# Patient Record
Sex: FEMALE | Race: BLACK OR AFRICAN AMERICAN | NOT HISPANIC OR LATINO | URBAN - METROPOLITAN AREA
[De-identification: names, ages, dates, MRNs, and addresses within clinical notes are randomized per-mention and may not be internally consistent; named-entity substitution may affect disease eponyms.]

---

## 2018-09-11 ENCOUNTER — INPATIENT (INPATIENT)
Facility: HOSPITAL | Age: 48
LOS: 0 days | Discharge: HOME | DRG: 282 | End: 2018-09-12
Attending: INTERNAL MEDICINE | Admitting: INTERNAL MEDICINE
Payer: COMMERCIAL

## 2018-09-11 VITALS
HEART RATE: 90 BPM | SYSTOLIC BLOOD PRESSURE: 132 MMHG | DIASTOLIC BLOOD PRESSURE: 86 MMHG | RESPIRATION RATE: 18 BRPM | TEMPERATURE: 98 F | OXYGEN SATURATION: 96 %

## 2018-09-11 DIAGNOSIS — I10 ESSENTIAL (PRIMARY) HYPERTENSION: ICD-10-CM

## 2018-09-11 DIAGNOSIS — E87.6 HYPOKALEMIA: ICD-10-CM

## 2018-09-11 DIAGNOSIS — Q04.8 OTHER SPECIFIED CONGENITAL MALFORMATIONS OF BRAIN: Chronic | ICD-10-CM

## 2018-09-11 DIAGNOSIS — I25.10 ATHEROSCLEROTIC HEART DISEASE OF NATIVE CORONARY ARTERY WITHOUT ANGINA PECTORIS: ICD-10-CM

## 2018-09-11 DIAGNOSIS — I21.4 NON-ST ELEVATION (NSTEMI) MYOCARDIAL INFARCTION: ICD-10-CM

## 2018-09-11 DIAGNOSIS — D57.3 SICKLE-CELL TRAIT: ICD-10-CM

## 2018-09-11 DIAGNOSIS — R07.9 CHEST PAIN, UNSPECIFIED: ICD-10-CM

## 2018-09-11 LAB
ALBUMIN SERPL ELPH-MCNC: 3.9 G/DL — SIGNIFICANT CHANGE UP (ref 3.4–5)
ALP SERPL-CCNC: 63 U/L — SIGNIFICANT CHANGE UP (ref 40–120)
ALT FLD-CCNC: 27 U/L — SIGNIFICANT CHANGE UP (ref 12–42)
ANION GAP SERPL CALC-SCNC: 9 MMOL/L — SIGNIFICANT CHANGE UP (ref 9–16)
APPEARANCE UR: CLEAR — SIGNIFICANT CHANGE UP
APTT BLD: 32.3 SEC — SIGNIFICANT CHANGE UP (ref 27.5–36.5)
APTT BLD: 47.7 SEC — HIGH (ref 27.5–37.4)
AST SERPL-CCNC: 22 U/L — SIGNIFICANT CHANGE UP (ref 15–37)
BILIRUB SERPL-MCNC: 0.5 MG/DL — SIGNIFICANT CHANGE UP (ref 0.2–1.2)
BILIRUB UR-MCNC: NEGATIVE — SIGNIFICANT CHANGE UP
BUN SERPL-MCNC: 10 MG/DL — SIGNIFICANT CHANGE UP (ref 7–23)
CALCIUM SERPL-MCNC: 9.3 MG/DL — SIGNIFICANT CHANGE UP (ref 8.5–10.5)
CHLORIDE SERPL-SCNC: 103 MMOL/L — SIGNIFICANT CHANGE UP (ref 96–108)
CK MB CFR SERPL CALC: 15 NG/ML — HIGH (ref 0–6.7)
CK SERPL-CCNC: 248 U/L — HIGH (ref 25–170)
CO2 SERPL-SCNC: 26 MMOL/L — SIGNIFICANT CHANGE UP (ref 22–31)
COLOR SPEC: YELLOW — SIGNIFICANT CHANGE UP
CREAT SERPL-MCNC: 1.09 MG/DL — SIGNIFICANT CHANGE UP (ref 0.5–1.3)
D DIMER BLD IA.RAPID-MCNC: 211 NG/ML DDU — SIGNIFICANT CHANGE UP
DIFF PNL FLD: NEGATIVE — SIGNIFICANT CHANGE UP
GLUCOSE SERPL-MCNC: 108 MG/DL — HIGH (ref 70–99)
GLUCOSE UR QL: NEGATIVE — SIGNIFICANT CHANGE UP
HCG UR QL: NEGATIVE — SIGNIFICANT CHANGE UP
HCT VFR BLD CALC: 41.8 % — SIGNIFICANT CHANGE UP (ref 34.5–45)
HCT VFR BLD CALC: 45.6 % — HIGH (ref 34.5–45)
HGB BLD-MCNC: 15.2 G/DL — SIGNIFICANT CHANGE UP (ref 11.5–15.5)
HGB BLD-MCNC: 16.1 G/DL — HIGH (ref 11.5–15.5)
INR BLD: 1.07 — SIGNIFICANT CHANGE UP (ref 0.88–1.16)
KETONES UR-MCNC: NEGATIVE — SIGNIFICANT CHANGE UP
LEUKOCYTE ESTERASE UR-ACNC: NEGATIVE — SIGNIFICANT CHANGE UP
LIDOCAIN IGE QN: 115 U/L — SIGNIFICANT CHANGE UP (ref 73–393)
MAGNESIUM SERPL-MCNC: 2 MG/DL — SIGNIFICANT CHANGE UP (ref 1.6–2.6)
MCHC RBC-ENTMCNC: 27.5 PG — SIGNIFICANT CHANGE UP (ref 27–34)
MCHC RBC-ENTMCNC: 27.8 PG — SIGNIFICANT CHANGE UP (ref 27–34)
MCHC RBC-ENTMCNC: 35.3 G/DL — SIGNIFICANT CHANGE UP (ref 32–36)
MCHC RBC-ENTMCNC: 36.4 G/DL — HIGH (ref 32–36)
MCV RBC AUTO: 75.7 FL — LOW (ref 80–100)
MCV RBC AUTO: 78.6 FL — LOW (ref 80–100)
NITRITE UR-MCNC: NEGATIVE — SIGNIFICANT CHANGE UP
NT-PROBNP SERPL-SCNC: 1233 PG/ML — HIGH
PH UR: 7.5 — SIGNIFICANT CHANGE UP (ref 5–8)
PLATELET # BLD AUTO: 297 K/UL — SIGNIFICANT CHANGE UP (ref 150–400)
PLATELET # BLD AUTO: 348 K/UL — SIGNIFICANT CHANGE UP (ref 150–400)
POTASSIUM SERPL-MCNC: 3.1 MMOL/L — LOW (ref 3.5–5.3)
POTASSIUM SERPL-SCNC: 3.1 MMOL/L — LOW (ref 3.5–5.3)
PROT SERPL-MCNC: 7.8 G/DL — SIGNIFICANT CHANGE UP (ref 6.4–8.2)
PROT UR-MCNC: NEGATIVE MG/DL — SIGNIFICANT CHANGE UP
PROTHROM AB SERPL-ACNC: 11.8 SEC — SIGNIFICANT CHANGE UP (ref 9.8–12.7)
RBC # BLD: 5.52 M/UL — HIGH (ref 3.8–5.2)
RBC # BLD: 5.8 M/UL — HIGH (ref 3.8–5.2)
RBC # FLD: 13.1 % — SIGNIFICANT CHANGE UP (ref 10.3–16.9)
RBC # FLD: 13.5 % — SIGNIFICANT CHANGE UP (ref 10.3–16.9)
SODIUM SERPL-SCNC: 138 MMOL/L — SIGNIFICANT CHANGE UP (ref 132–145)
SP GR SPEC: 1.01 — SIGNIFICANT CHANGE UP (ref 1–1.03)
TROPONIN I SERPL-MCNC: 0.82 NG/ML — CRITICAL HIGH (ref 0.02–0.06)
TROPONIN T SERPL-MCNC: 0.15 NG/ML — CRITICAL HIGH (ref 0–0.01)
TSH SERPL-MCNC: 1.82 UIU/ML — SIGNIFICANT CHANGE UP (ref 0.36–3.74)
UROBILINOGEN FLD QL: 0.2 E.U./DL — SIGNIFICANT CHANGE UP
WBC # BLD: 10.4 K/UL — SIGNIFICANT CHANGE UP (ref 3.8–10.5)
WBC # BLD: 12.3 K/UL — HIGH (ref 3.8–10.5)
WBC # FLD AUTO: 10.4 K/UL — SIGNIFICANT CHANGE UP (ref 3.8–10.5)
WBC # FLD AUTO: 12.3 K/UL — HIGH (ref 3.8–10.5)

## 2018-09-11 PROCEDURE — 71046 X-RAY EXAM CHEST 2 VIEWS: CPT | Mod: 26

## 2018-09-11 PROCEDURE — 99223 1ST HOSP IP/OBS HIGH 75: CPT

## 2018-09-11 PROCEDURE — 99285 EMERGENCY DEPT VISIT HI MDM: CPT | Mod: 25

## 2018-09-11 PROCEDURE — 93010 ELECTROCARDIOGRAM REPORT: CPT

## 2018-09-11 RX ORDER — POTASSIUM CHLORIDE 20 MEQ
40 PACKET (EA) ORAL ONCE
Qty: 0 | Refills: 0 | Status: COMPLETED | OUTPATIENT
Start: 2018-09-11 | End: 2018-09-11

## 2018-09-11 RX ORDER — ATORVASTATIN CALCIUM 80 MG/1
80 TABLET, FILM COATED ORAL DAILY
Qty: 0 | Refills: 0 | Status: DISCONTINUED | OUTPATIENT
Start: 2018-09-11 | End: 2018-09-12

## 2018-09-11 RX ORDER — TRIAMTERENE/HYDROCHLOROTHIAZID 75 MG-50MG
1 TABLET ORAL DAILY
Qty: 0 | Refills: 0 | Status: DISCONTINUED | OUTPATIENT
Start: 2018-09-12 | End: 2018-09-12

## 2018-09-11 RX ORDER — ASPIRIN/CALCIUM CARB/MAGNESIUM 324 MG
325 TABLET ORAL DAILY
Qty: 0 | Refills: 0 | Status: DISCONTINUED | OUTPATIENT
Start: 2018-09-12 | End: 2018-09-12

## 2018-09-11 RX ORDER — HEPARIN SODIUM 5000 [USP'U]/ML
INJECTION INTRAVENOUS; SUBCUTANEOUS
Qty: 25000 | Refills: 0 | Status: DISCONTINUED | OUTPATIENT
Start: 2018-09-11 | End: 2018-09-12

## 2018-09-11 RX ORDER — CLOPIDOGREL BISULFATE 75 MG/1
75 TABLET, FILM COATED ORAL DAILY
Qty: 0 | Refills: 0 | Status: DISCONTINUED | OUTPATIENT
Start: 2018-09-12 | End: 2018-09-12

## 2018-09-11 RX ORDER — METOPROLOL TARTRATE 50 MG
25 TABLET ORAL ONCE
Qty: 0 | Refills: 0 | Status: COMPLETED | OUTPATIENT
Start: 2018-09-11 | End: 2018-09-11

## 2018-09-11 RX ORDER — ASPIRIN/CALCIUM CARB/MAGNESIUM 324 MG
325 TABLET ORAL ONCE
Qty: 0 | Refills: 0 | Status: COMPLETED | OUTPATIENT
Start: 2018-09-11 | End: 2018-09-11

## 2018-09-11 RX ORDER — PANTOPRAZOLE SODIUM 20 MG/1
40 TABLET, DELAYED RELEASE ORAL ONCE
Qty: 0 | Refills: 0 | Status: COMPLETED | OUTPATIENT
Start: 2018-09-11 | End: 2018-09-11

## 2018-09-11 RX ORDER — METOPROLOL TARTRATE 50 MG
12.5 TABLET ORAL
Qty: 0 | Refills: 0 | Status: DISCONTINUED | OUTPATIENT
Start: 2018-09-11 | End: 2018-09-12

## 2018-09-11 RX ORDER — AMLODIPINE BESYLATE 2.5 MG/1
5 TABLET ORAL DAILY
Qty: 0 | Refills: 0 | Status: DISCONTINUED | OUTPATIENT
Start: 2018-09-11 | End: 2018-09-12

## 2018-09-11 RX ORDER — HEPARIN SODIUM 5000 [USP'U]/ML
4700 INJECTION INTRAVENOUS; SUBCUTANEOUS EVERY 6 HOURS
Qty: 0 | Refills: 0 | Status: DISCONTINUED | OUTPATIENT
Start: 2018-09-11 | End: 2018-09-11

## 2018-09-11 RX ORDER — SODIUM CHLORIDE 9 MG/ML
500 INJECTION INTRAMUSCULAR; INTRAVENOUS; SUBCUTANEOUS
Qty: 0 | Refills: 0 | Status: DISCONTINUED | OUTPATIENT
Start: 2018-09-11 | End: 2018-09-12

## 2018-09-11 RX ORDER — HEPARIN SODIUM 5000 [USP'U]/ML
4700 INJECTION INTRAVENOUS; SUBCUTANEOUS ONCE
Qty: 0 | Refills: 0 | Status: COMPLETED | OUTPATIENT
Start: 2018-09-11 | End: 2018-09-11

## 2018-09-11 RX ORDER — CLOPIDOGREL BISULFATE 75 MG/1
600 TABLET, FILM COATED ORAL ONCE
Qty: 0 | Refills: 0 | Status: COMPLETED | OUTPATIENT
Start: 2018-09-11 | End: 2018-09-11

## 2018-09-11 RX ORDER — ATORVASTATIN CALCIUM 80 MG/1
80 TABLET, FILM COATED ORAL ONCE
Qty: 0 | Refills: 0 | Status: COMPLETED | OUTPATIENT
Start: 2018-09-11 | End: 2018-09-11

## 2018-09-11 RX ORDER — ALPRAZOLAM 0.25 MG
0.25 TABLET ORAL ONCE
Qty: 0 | Refills: 0 | Status: DISCONTINUED | OUTPATIENT
Start: 2018-09-11 | End: 2018-09-12

## 2018-09-11 RX ADMIN — HEPARIN SODIUM 4700 UNIT(S): 5000 INJECTION INTRAVENOUS; SUBCUTANEOUS at 17:29

## 2018-09-11 RX ADMIN — Medication 25 MILLIGRAM(S): at 17:29

## 2018-09-11 RX ADMIN — Medication 40 MILLIEQUIVALENT(S): at 17:29

## 2018-09-11 RX ADMIN — ATORVASTATIN CALCIUM 80 MILLIGRAM(S): 80 TABLET, FILM COATED ORAL at 22:33

## 2018-09-11 RX ADMIN — HEPARIN SODIUM 950 UNIT(S)/HR: 5000 INJECTION INTRAVENOUS; SUBCUTANEOUS at 17:29

## 2018-09-11 RX ADMIN — PANTOPRAZOLE SODIUM 40 MILLIGRAM(S): 20 TABLET, DELAYED RELEASE ORAL at 16:39

## 2018-09-11 RX ADMIN — ATORVASTATIN CALCIUM 80 MILLIGRAM(S): 80 TABLET, FILM COATED ORAL at 17:29

## 2018-09-11 RX ADMIN — CLOPIDOGREL BISULFATE 600 MILLIGRAM(S): 75 TABLET, FILM COATED ORAL at 17:29

## 2018-09-11 RX ADMIN — Medication 325 MILLIGRAM(S): at 17:17

## 2018-09-11 NOTE — ED ADULT NURSE NOTE - OBJECTIVE STATEMENT
here for an episode of chest pain while she was working out. pt denies any chest pain on arrival states pain was relieved when she passed gas. Pt denies cp, sob , dizziness, headache and back pain

## 2018-09-11 NOTE — H&P ADULT - NSHPPHYSICALEXAM_GEN_ALL_CORE
Temp 98.6F, HR 70bpm, /80, RR 16, O2 sat 98% RA, Weight 200lbs, Height 5' 6"    T(C): 36.9 (09-11-18 @ 17:32), Max: 36.9 (09-11-18 @ 17:32)  HR: 82 (09-11-18 @ 20:51) (82 - 104)  BP: 129/88 (09-11-18 @ 20:51) (129/88 - 156/108)  RR: 16 (09-11-18 @ 20:51) (16 - 18)  SpO2: 96% (09-11-18 @ 20:51) (96% - 98%)  Wt(kg): --    Appearance: Normal	  HEENT:   Normal oral mucosa, PERRL, EOMI	  Neck: Supple,- JVD; No Carotid Bruit and 2+ pulses B/L  Cardiovascular: Normal S1 S2, No JVD, No murmurs  Respiratory: Lungs clear to auscultation/No Rales, Rhonchi, Wheezing	  Gastrointestinal:  Soft, Non-tender, + BS	  Skin: No rashes, No ecchymoses, No cyanosis  Extremities: Normal range of motion, No clubbing, cyanosis or edema  Vascular: Femoral pulses 2+ b/l without bruit, DP 2+ b/l, PT 1+ b/l  Neurologic: Non-focal  Psychiatry: A & O x 3, Mood & affect appropriate

## 2018-09-11 NOTE — H&P ADULT - ATTENDING COMMENTS
Assessment: Patient personally seen and examined myself during rounds with the Physician Assistant/House Staff/Nurse Practitioner   ON DATE 9/11/18  Physician Assistant/House Staff/Nurse Practitioner note read, including vitals, physical findings, laboratory data, and radiological reports.   Revisions included below.   Direct personal management at bed side and extensive interpretation of the data.    Plan was outlined and discussed in details with the Physician Assistant/House Staff/Nurse Practitioner.    Decision making of high complexity   Risk high of complications, morbidity, and/or mortality  Assessment and Action taken for acute disease activity to reflect the level of care provided:  -Hemodynamic evaluation and support  -ACS assessment and treatment as applicable  -Heart failure assessment and treatment as applicable  -Cardiac Telemetry reviewed  -Medication reconciliation  -Review laboratory data  -EKG reviewed - no stemi  -Echo ordered  -Interdisciplinary discussion with IC / EP / HF / CTS teams as needed  TIME SPENT in evaluation and management, reassessments, review and interpretation of labs and x-rays, and hemodynamic management, formulating a plan and coordinating care: ___70____ MIN.  Time does not include procedural time.    Jenny Casey MD  Cardiology    Mobile: [phone]  Office: [phone]  [address] [id number]

## 2018-09-11 NOTE — H&P ADULT - PROBLEM SELECTOR PLAN 1
Telemetry, ECG serial CE, Echocardiogram and NPO for Left Heart Catheterization with possible intervention.  ECG NSR@100bpm with TWI in lateral leads Troponin 0.812.  NPO for Cath  Consent and Precathed

## 2018-09-11 NOTE — H&P ADULT - NSHPREVIEWOFSYSTEMS_GEN_ALL_CORE
GENERAL, CONSTITUTIONAL : denies recent weight loss, fever, chills  EYES, VISION: denies changes in vision   EARS, NOSE, THROAT: denies hearing loss  HEART, CARDIOVASCULAR: admits to  chest pain described as pressure,  palpitations,   RESPIRATORY:Admit SOB 2/2 chest pain,  Denies cough, wheezing, PND, orthopnea  GASTROINTESTINAL: Admits to nausea no emesis Denies abdominal pain, heartburn, bloody stool, dark tarry stool  GENITOURINARY: Denies frequent urination, urgency  MUSCULOSKELETAL denies joint pain or swelling, restricted motion, musculoskeletal pain.   SKIN & INTEGUMENTARY Denies rashes, sores, blisters, blisters, growths.  NEUROLOGICAL: Denies numbness or tingling sensations, sensation loss, burning.   PSYCHIATRIC: Denies nervousness, anxiety, depression  ENDOCRINE Denies heat or cold intolerance, excessive thirst  HEMATOLOGIC/LYMPHATIC: Denies abnormal bleeding, bleeding of any kind

## 2018-09-11 NOTE — ED ADULT TRIAGE NOTE - CHIEF COMPLAINT QUOTE
sent from PMD for chest pain starting an hour ago and abnormal EKG. pt states pain has resolved took 162 of ASA given by EMS

## 2018-09-11 NOTE — ED ADULT NURSE NOTE - CHPI ED NUR SYMPTOMS NEG
no chills/no dizziness/no nausea/no back pain/no congestion/no chest pain/no syncope/no shortness of breath/no fever/no diaphoresis/no vomiting

## 2018-09-11 NOTE — ED PROVIDER NOTE - DIAGNOSTIC INTERPRETATION
Xray (wet reads) interpreted by CAYLA NASH   CXR - Cardiac silhouette, aortic knob, mediastinal and hilar contours appear wnl, no acute consolidation, infiltrate, effusion, or PTX. No bony abnormalities noted

## 2018-09-11 NOTE — H&P ADULT - HISTORY OF PRESENT ILLNESS
INCOMPLETE   49 yo female with a PMhx HTN, ventriculomegaly initially presented to a clinic today (9/11/2018) due to ongoing chest pain. Patient reports she was doing some weight lifting and planks around noon today and felt suddenly flushed and developed sharp substernal chest pain radiating down her left arm associated with palpitations and nausea. Patient notes she was belching/burping and gasping for air when chest pain occured and symptoms self-resolved within several minutes. Patient went to an outpatient clinic,  EMS was called and patient took  mg x one dose in route. On arrival to [facility] ED; Vital signs stable: BP: 132/86, HR: 80s, RR: 19, Afebrile, and O2: 96% RA. 12 Lead EKG revealed Sinus Rhythm with nonspecific TW changes in V6, I, AVL (CONFRIM). Pertinent lab values revealed include WBC: 12.3K, D Dimer negative, K: 3.1, TSH WNL and Troponin 0.819. ACS Protocol was initiated and patient was loaded with  mg, Plavix 600 mg, Heparin gtt with bolus started and patient received Lipitor 80 mg daily, Lopressor 25 mg PO x one dose, Protonix 40 mg IV x one dose and Kdur 40 mEq orally x one dose. Patient is now transferred to [facility] for management of R/I NSTEMI and plan for cardiac catheterization with possible intervention if clinically indicated. INCOMPLETE   47 yo female with a PMhx HTN, ventriculomegaly initially presented to a clinic today (9/11/2018) due to ongoing chest pain. Patient reports she was doing some weight lifting and planks around noon today and felt suddenly flushed and developed sharp substernal chest pain radiating down her left arm associated with palpitations and nausea. Patient notes she was belching/burping and gasping for air when chest pain occured and symptoms self-resolved within several minutes. Patient went to an outpatient clinic,  EMS was called and patient took  mg x one dose in route. On arrival to [facility] ED; Vital signs stable: BP: 132/86, HR: 80s, RR: 19, Afebrile, and O2: 96% RA. 12 Lead EKG revealed Sinus Rhythm with nonspecific TW changes in V6, I, AVL (CONFRIM). Pertinent lab values revealed include WBC: 12.3K, D Dimer negative, K: 3.1, TSH WNL, BNO 1,233 and Troponin 0.819. ACS Protocol was initiated and patient was loaded with  mg, Plavix 600 mg, Heparin gtt with bolus started and patient received Lipitor 80 mg daily, Lopressor 25 mg PO x one dose, Protonix 40 mg IV x one dose and Kdur 40 mEq orally x one dose. Patient is now transferred to [facility] for management of R/I NSTEMI and plan for cardiac catheterization with possible intervention if clinically indicated. 49 yo obese female  with a PMhx HTN, ventriculomegaly initially presented to a clinic today (9/11/2018) due to ongoing chest pain. Patient reports she was doing some weight lifting and planks around noon today and felt suddenly flushed and developed sharp substernal chest pain radiating down her left arm associated with palpitations and nausea. Patient notes she was belching/burping and gasping for air when chest pain occured and symptoms self-resolved within several minutes. Patient went to an outpatient clinic,  EMS was called and patient took  mg x one dose in route. On arrival to [facility] ED; Vital signs stable: BP: 132/86, HR: 80s, RR: 19, Afebrile, and O2: 96% RA. 12 Lead EKG revealed Sinus Rhythm with nonspecific TW changes in V6, I, AVL (CONFRIM). Pertinent lab values revealed include WBC: 12.3K, D Dimer negative, K: 3.1, TSH WNL, BNO 1,233 and Troponin 0.819. ACS Protocol was initiated and patient was loaded with  mg, Plavix 600 mg, Heparin gtt with bolus started and patient received Lipitor 80 mg daily, Lopressor 25 mg PO x one dose, Protonix 40 mg IV x one dose and Kdur 40 mEq orally x one dose. Patient is now transferred to [facility] for management of R/I NSTEMI and plan for cardiac catheterization with possible intervention if clinically indicated. 49 yo obese female  with a PMhx HTN, ventriculomegaly initially presented to a clinic today (9/11/2018) due to ongoing chest pain. Patient reports she was doing some weight lifting and planks in her office around noon today and felt suddenly flushed and developed  substernal chest pain, described as pressure, radiating down her left arm associated with palpitations and nausea. Patient notes she was belching/burping and gasping for air when chest pain occured and symptoms self-resolved within several minutes. Patient went to an outpatient clinic,  EMS was called and patient took  mg x one dose in route. On arrival to [facility] ED; Vital signs stable: BP: 132/86, HR: 80s, RR: 19, Afebrile, and O2: 96% RA. 12 Lead EKG revealed Sinus Rhythm with nonspecific TW changes in V6, I, AVL (CONFRIM). Pertinent lab values revealed include WBC: 12.3K, D Dimer negative, K: 3.1, TSH WNL, BNO 1,233 and Troponin 0.819. ACS Protocol was initiated and patient was loaded with  mg, Plavix 600 mg, Heparin gtt with bolus started and patient received Lipitor 80 mg daily, Lopressor 25 mg PO x one dose, Protonix 40 mg IV x one dose and Kdur 40 mEq orally x one dose. Patient is now transferred to [facility] for management of R/I NSTEMI and plan for cardiac catheterization with possible intervention if clinically indicated. 47 yo obese female  with a PMhx HTN, ventriculomegaly initially presented to a clinic today (9/11/2018) due to ongoing chest pain. Patient reports she was doing some weight lifting and planks in her office around noon today and felt suddenly flushed and developed  substernal chest pain, described as pressure, radiating down her left arm associated with palpitations and nausea. Patient notes she was belching/burping and gasping for air when chest pain occured and symptoms self-resolved within several minutes. Patient went to an outpatient clinic,  EMS was called and patient took  mg x one dose in route.  Pt. states she has had similar symptoms a year ago and went to see her PMD who performed an ECG revealing normal finding.  However, pt. never had a stress test or any other cardiac workup. On arrival to [facility] ED; Vital signs stable: BP: 132/86, HR: 80s, RR: 19, Afebrile, and O2: 96% RA. 12 Lead EKG revealed Sinus Rhythm with nonspecific TW changes in V6, I, AVL (CONFRIM). Pertinent lab values revealed include WBC: 12.3K, D Dimer negative, K: 3.1, TSH WNL, BNO 1,233 and Troponin 0.819. ACS Protocol was initiated and patient was loaded with  mg, Plavix 600 mg, Heparin gtt with bolus started and patient received Lipitor 80 mg daily, Lopressor 25 mg PO x one dose, Protonix 40 mg IV x one dose and Kdur 40 mEq orally x one dose. Patient is now transferred to [facility] for management of R/I NSTEMI and plan for cardiac catheterization with possible intervention if clinically indicated. 49 yo obese female  with a PMhx HTN, Sickle Cell Trait,  ventriculomegaly initially presented to a clinic today (9/11/2018) due to ongoing chest pain. Patient reports she was doing some weight lifting and planks in her office around noon today and felt suddenly flushed and developed  substernal chest pain, described as pressure, radiating down her left arm associated with palpitations and nausea. Patient notes she was belching/burping and gasping for air when chest pain occured and symptoms self-resolved within several minutes. Patient went to an outpatient clinic,  EMS was called and patient took  mg x one dose in route.  Pt. states she has had similar symptoms a year ago and went to see her PMD who performed an ECG revealing normal finding.  However, pt. never had a stress test or any other cardiac workup. On arrival to [facility] ED; Vital signs stable: BP: 132/86, HR: 80s, RR: 19, Afebrile, and O2: 96% RA. 12 Lead EKG revealed Sinus Rhythm with nonspecific TW changes in V6, I, AVL (CONFRIM). Pertinent lab values revealed include WBC: 12.3K, D Dimer negative, K: 3.1, TSH WNL, BNO 1,233 and Troponin 0.819. ACS Protocol was initiated and patient was loaded with  mg, Plavix 600 mg, Heparin gtt with bolus started and patient received Lipitor 80 mg daily, Lopressor 25 mg PO x one dose, Protonix 40 mg IV x one dose and Kdur 40 mEq orally x one dose. Patient is now transferred to [facility] for management of R/I NSTEMI and plan for cardiac catheterization with possible intervention if clinically indicated. 49 yo obese female  with a PMhx HTN, Sickle Cell Trait,  ventriculomegaly initially presented to a clinic today (9/11/2018) due to ongoing chest pain. Patient reports she was doing some weight lifting and planks in her office around noon today and felt suddenly flushed and developed  substernal chest pain, described as pressure, radiating down her left arm associated with palpitations and nausea. Patient notes she was belching/burping and gasping for air when chest pain occured and symptoms self-resolved within several minutes. Patient went to an outpatient clinic,  EMS was called and patient took  mg x one dose in route.  Pt. states she has had similar symptoms a year ago and went to see her PMD who performed an ECG revealing normal finding.  However, pt. never had a stress test or any other cardiac workup. On arrival to [facility] ED; Vital signs stable: BP: 132/86, HR: 80s, RR: 19, Afebrile, and O2: 96% RA. 12 Lead EKG revealed Sinus Rhythm with nonspecific TW changes in V6, I, AVL Pertinent lab values revealed include WBC: 12.3K, D Dimer negative, K: 3.1, TSH WNL, BNO 1,233 and Troponin 0.819. ACS Protocol was initiated and patient was loaded with  mg, Plavix 600 mg, Heparin gtt with bolus started and patient received Lipitor 80 mg daily, Lopressor 25 mg PO x one dose, Protonix 40 mg IV x one dose and Kdur 40 mEq orally x one dose. Patient is now transferred to [facility] for management of R/I NSTEMI and plan for cardiac catheterization with possible intervention if clinically indicated.

## 2018-09-11 NOTE — H&P ADULT - ASSESSMENT
47 yo obese female  with a PMhx HTN, ventriculomegaly initially presented to a clinic today (9/11/2018) due to ongoing chest pain. Patient reports she was doing some weight lifting and planks in her office around noon today and felt suddenly flushed and developed  substernal chest pain, described as pressure, radiating down her left arm associated with palpitations and nausea. Pt. ruled in NSTEMI with peak Troponin 0.812 and ECG TWI in lateral leads.  Patient is now transferred to [facility] for management of R/I NSTEMI and plan for cardiac catheterization with possible intervention if clinically indicated. 49 yo obese female  with a PMhx HTN, Sickle Cell Trait,  ventriculomegaly initially presented to a clinic today (9/11/2018) due to ongoing chest pain. Patient reports she was doing some weight lifting and planks in her office around noon today and felt suddenly flushed and developed  substernal chest pain, described as pressure, radiating down her left arm associated with palpitations and nausea. Pt. ruled in NSTEMI with peak Troponin 0.812 and ECG TWI in lateral leads.  Patient is now transferred to [facility] for management of R/I NSTEMI and plan for cardiac catheterization with possible intervention if clinically indicated.

## 2018-09-11 NOTE — H&P ADULT - NSHPLABSRESULTS_GEN_ALL_CORE
16.1   12.3  )-----------( 348      ( 11 Sep 2018 16:22 )             45.6       09-11    138  |  103  |  10  ----------------------------<  108<H>  3.1<L>   |  26  |  1.09    Ca    9.3      11 Sep 2018 16:22  Mg     2.0     09-11    TPro  7.8  /  Alb  3.9  /  TBili  0.5  /  DBili  x   /  AST  22  /  ALT  27  /  AlkPhos  63  09-11      PT/INR - ( 11 Sep 2018 17:16 )   PT: 11.8 sec;   INR: 1.07          PTT - ( 11 Sep 2018 17:16 )  PTT:32.3 sec    CARDIAC MARKERS ( 11 Sep 2018 16:22 )  0.819 ng/mL / x     / x     / x     / x                EKG: 16.1   12.3  )-----------( 348      ( 11 Sep 2018 16:22 )             45.6       09-11    138  |  103  |  10  ----------------------------<  108<H>  3.1<L>   |  26  |  1.09    Ca    9.3      11 Sep 2018 16:22  Mg     2.0     09-11    TPro  7.8  /  Alb  3.9  /  TBili  0.5  /  DBili  x   /  AST  22  /  ALT  27  /  AlkPhos  63  09-11      PT/INR - ( 11 Sep 2018 17:16 )   PT: 11.8 sec;   INR: 1.07          PTT - ( 11 Sep 2018 17:16 )  PTT:32.3 sec    CARDIAC MARKERS ( 11 Sep 2018 16:22 )  0.819 ng/mL / x     / x     / x     / x                EKG: NSR 16.1   12.3  )-----------( 348      ( 11 Sep 2018 16:22 )             45.6       09-11    138  |  103  |  10  ----------------------------<  108<H>  3.1<L>   |  26  |  1.09    Ca    9.3      11 Sep 2018 16:22  Mg     2.0     09-11    TPro  7.8  /  Alb  3.9  /  TBili  0.5  /  DBili  x   /  AST  22  /  ALT  27  /  AlkPhos  63  09-11      PT/INR - ( 11 Sep 2018 17:16 )   PT: 11.8 sec;   INR: 1.07          PTT - ( 11 Sep 2018 17:16 )  PTT:32.3 sec    CARDIAC MARKERS ( 11 Sep 2018 16:22 )  0.819 ng/mL / x     / x     / x     / x                EKG: NSR @100bpm with TWI later leads

## 2018-09-11 NOTE — ED PROVIDER NOTE - OBJECTIVE STATEMENT
49 yo F with PMHx of HTN, ventriculomegaly in the past, sent from outpt clinic for evaluation of CP x 3 hrs.  Pt reports doing some weight lifting and planks at noon, noted feeling flushed and nauseated with sharp pain in the mid chest region around 1pm with associated burping, gas sensation.  Noted some tingling sensation in the L arm at the same time with palpitations. Sx resolved after few mins, went to outpt clinic, s/p EKG with NSR and sent in for further evaluation.  Denies fever, chills, diaphoresis, COLON, SOB, orthopnea, cough, hemoptysis, wheezing, peripheral edema, focal weakness, numbness, paresthesia, HA, dizziness, neck pain, V/D/C, abdominal pain, change in urinary/bowel function, trauma, fall, rash, and malaise.  No h/o prior stress test or TTE.  No family h/o cardiac dz or sudden death noted

## 2018-09-11 NOTE — H&P ADULT - PSH
Ventriculomegaly of brain, congenital Adenomatous polyp of colon  polyp removed no cancer of colon  Ventriculomegaly of brain, congenital

## 2018-09-11 NOTE — ED PROVIDER NOTE - PHYSICAL EXAMINATION
Vital Signs - nursing notes reviewed and confirmed  Gen - WDWN F, NAD, comfortable and non-toxic appearing, speaking in full sentences   Skin - warm, dry, intact  HEENT - AT/NC, PERRL, EOMI, no conjunctival injection, moist oral mucosa, o/p clear with no erythema, edema, or exudate, uvula midline, airway patent, neck supple and NT, FROM, no JVD or carotid bruits b/l, no palpable nodes  CV - S1S2, R/R/R  Resp - respiration non-labored, CTAB, symmetric bs b/l, no r/r/w  GI - NABS, soft, ND, NT, no rebound or guarding, no CVAT b/l   MS - w/w/p, no c/c/e, calves supple and NT, distal pulses symmetric b/l, brisk cap refills   Neuro - AxOx3, no focal neuro deficits, CN II-XII grossly intact, ambulatory without gait disturbance

## 2018-09-11 NOTE — ED PROVIDER NOTE - CARE PLAN
Principal Discharge DX:	Atypical chest pain Principal Discharge DX:	NSTEMI (non-ST elevated myocardial infarction)

## 2018-09-11 NOTE — ED PROVIDER NOTE - MEDICAL DECISION MAKING DETAILS
AFVSS at time of d/c, pt non-toxic appearing, results, ddx, and f/u plans discussed with pt at bedside, d/c'd home to f/u with PMD, strict return precautions discussed, prompt return to ER for any worsening or new sx, pt verbalized understanding. pt p/w mid sternal CP with palpitations and L arm tingling at 1pm today while lifting weights, outpt EKG with NSR, repeat EKG in the ED pt p/w mid sternal CP with palpitations and L arm tingling at 1pm today while lifting weights, outpt EKG with NSR, repeat EKG in the ED with no dynamic changes, CP free in the ED, labs with elevated trop of 0.8 with normal renal function, lytes repleted, plavix/ASA loaded, started on heparin gtt, given dose of bb, statin, and case discussed with cardiology, accepted to Dr. Casey's service

## 2018-09-11 NOTE — ED PROVIDER NOTE - ATTENDING CONTRIBUTION TO CARE
49 yo F with PMHx of HTN, ventriculomegaly in the past, sent from outpt clinic for evaluation of CP x 3 hrs. VSS. CTAB, [facility]. + trop 0.8. Admitted to cards tele. at [facility]. EKGs wnl (slight TWi lat leads).

## 2018-09-12 ENCOUNTER — TRANSCRIPTION ENCOUNTER (OUTPATIENT)
Age: 48
End: 2018-09-12

## 2018-09-12 ENCOUNTER — INBOUND DOCUMENT (OUTPATIENT)
Age: 48
End: 2018-09-12

## 2018-09-12 VITALS — TEMPERATURE: 98 F

## 2018-09-12 DIAGNOSIS — D12.6 BENIGN NEOPLASM OF COLON, UNSPECIFIED: Chronic | ICD-10-CM

## 2018-09-12 LAB
ALBUMIN SERPL ELPH-MCNC: 4 G/DL — SIGNIFICANT CHANGE UP (ref 3.3–5)
ALP SERPL-CCNC: 54 U/L — SIGNIFICANT CHANGE UP (ref 40–120)
ALT FLD-CCNC: 19 U/L — SIGNIFICANT CHANGE UP (ref 10–45)
ANION GAP SERPL CALC-SCNC: 13 MMOL/L — SIGNIFICANT CHANGE UP (ref 5–17)
APTT BLD: 52.2 SEC — HIGH (ref 27.5–37.4)
AST SERPL-CCNC: 27 U/L — SIGNIFICANT CHANGE UP (ref 10–40)
BASOPHILS NFR BLD AUTO: 0.4 % — SIGNIFICANT CHANGE UP (ref 0–2)
BILIRUB SERPL-MCNC: 0.7 MG/DL — SIGNIFICANT CHANGE UP (ref 0.2–1.2)
BUN SERPL-MCNC: 9 MG/DL — SIGNIFICANT CHANGE UP (ref 7–23)
CALCIUM SERPL-MCNC: 9.4 MG/DL — SIGNIFICANT CHANGE UP (ref 8.4–10.5)
CHLORIDE SERPL-SCNC: 100 MMOL/L — SIGNIFICANT CHANGE UP (ref 96–108)
CHOLEST SERPL-MCNC: 183 MG/DL — SIGNIFICANT CHANGE UP (ref 10–199)
CK MB CFR SERPL CALC: 24.1 NG/ML — HIGH (ref 0–6.7)
CK SERPL-CCNC: 268 U/L — HIGH (ref 25–170)
CO2 SERPL-SCNC: 24 MMOL/L — SIGNIFICANT CHANGE UP (ref 22–31)
CREAT SERPL-MCNC: 0.81 MG/DL — SIGNIFICANT CHANGE UP (ref 0.5–1.3)
EOSINOPHIL NFR BLD AUTO: 2 % — SIGNIFICANT CHANGE UP (ref 0–6)
GLUCOSE SERPL-MCNC: 101 MG/DL — HIGH (ref 70–99)
HCT VFR BLD CALC: 44.3 % — SIGNIFICANT CHANGE UP (ref 34.5–45)
HDLC SERPL-MCNC: 38 MG/DL — LOW
HGB BLD-MCNC: 15.7 G/DL — HIGH (ref 11.5–15.5)
INR BLD: 1.06 — SIGNIFICANT CHANGE UP (ref 0.88–1.16)
LIPID PNL WITH DIRECT LDL SERPL: 116 MG/DL — SIGNIFICANT CHANGE UP
LYMPHOCYTES # BLD AUTO: 32.1 % — SIGNIFICANT CHANGE UP (ref 13–44)
MAGNESIUM SERPL-MCNC: 2 MG/DL — SIGNIFICANT CHANGE UP (ref 1.6–2.6)
MCHC RBC-ENTMCNC: 27.7 PG — SIGNIFICANT CHANGE UP (ref 27–34)
MCHC RBC-ENTMCNC: 35.4 G/DL — SIGNIFICANT CHANGE UP (ref 32–36)
MCV RBC AUTO: 78.3 FL — LOW (ref 80–100)
MONOCYTES NFR BLD AUTO: 7.9 % — SIGNIFICANT CHANGE UP (ref 2–14)
NEUTROPHILS NFR BLD AUTO: 57.6 % — SIGNIFICANT CHANGE UP (ref 43–77)
PLATELET # BLD AUTO: 278 K/UL — SIGNIFICANT CHANGE UP (ref 150–400)
POTASSIUM SERPL-MCNC: 3.8 MMOL/L — SIGNIFICANT CHANGE UP (ref 3.5–5.3)
POTASSIUM SERPL-SCNC: 3.8 MMOL/L — SIGNIFICANT CHANGE UP (ref 3.5–5.3)
PROT SERPL-MCNC: 6.9 G/DL — SIGNIFICANT CHANGE UP (ref 6–8.3)
PROTHROM AB SERPL-ACNC: 11.8 SEC — SIGNIFICANT CHANGE UP (ref 9.8–12.7)
RBC # BLD: 5.66 M/UL — HIGH (ref 3.8–5.2)
RBC # FLD: 13.6 % — SIGNIFICANT CHANGE UP (ref 10.3–16.9)
SODIUM SERPL-SCNC: 137 MMOL/L — SIGNIFICANT CHANGE UP (ref 135–145)
TOTAL CHOLESTEROL/HDL RATIO MEASUREMENT: 4.8 RATIO — SIGNIFICANT CHANGE UP (ref 3.3–7.1)
TRIGL SERPL-MCNC: 144 MG/DL — SIGNIFICANT CHANGE UP (ref 10–149)
TROPONIN T SERPL-MCNC: 0.26 NG/ML — CRITICAL HIGH (ref 0–0.01)
WBC # BLD: 9.5 K/UL — SIGNIFICANT CHANGE UP (ref 3.8–10.5)
WBC # FLD AUTO: 9.5 K/UL — SIGNIFICANT CHANGE UP (ref 3.8–10.5)

## 2018-09-12 PROCEDURE — 71046 X-RAY EXAM CHEST 2 VIEWS: CPT

## 2018-09-12 PROCEDURE — C9113: CPT

## 2018-09-12 PROCEDURE — 36415 COLL VENOUS BLD VENIPUNCTURE: CPT

## 2018-09-12 PROCEDURE — 84484 ASSAY OF TROPONIN QUANT: CPT

## 2018-09-12 PROCEDURE — 82553 CREATINE MB FRACTION: CPT

## 2018-09-12 PROCEDURE — 81003 URINALYSIS AUTO W/O SCOPE: CPT

## 2018-09-12 PROCEDURE — C1769: CPT

## 2018-09-12 PROCEDURE — 84443 ASSAY THYROID STIM HORMONE: CPT

## 2018-09-12 PROCEDURE — 85610 PROTHROMBIN TIME: CPT

## 2018-09-12 PROCEDURE — C1894: CPT

## 2018-09-12 PROCEDURE — 80061 LIPID PANEL: CPT

## 2018-09-12 PROCEDURE — 93458 L HRT ARTERY/VENTRICLE ANGIO: CPT | Mod: 26

## 2018-09-12 PROCEDURE — 96375 TX/PRO/DX INJ NEW DRUG ADDON: CPT

## 2018-09-12 PROCEDURE — 85379 FIBRIN DEGRADATION QUANT: CPT

## 2018-09-12 PROCEDURE — 83735 ASSAY OF MAGNESIUM: CPT

## 2018-09-12 PROCEDURE — 85730 THROMBOPLASTIN TIME PARTIAL: CPT

## 2018-09-12 PROCEDURE — 81025 URINE PREGNANCY TEST: CPT

## 2018-09-12 PROCEDURE — 85025 COMPLETE CBC W/AUTO DIFF WBC: CPT

## 2018-09-12 PROCEDURE — 82550 ASSAY OF CK (CPK): CPT

## 2018-09-12 PROCEDURE — 93005 ELECTROCARDIOGRAM TRACING: CPT | Mod: 76

## 2018-09-12 PROCEDURE — 85027 COMPLETE CBC AUTOMATED: CPT

## 2018-09-12 PROCEDURE — 83690 ASSAY OF LIPASE: CPT

## 2018-09-12 PROCEDURE — 80053 COMPREHEN METABOLIC PANEL: CPT

## 2018-09-12 PROCEDURE — 82248 BILIRUBIN DIRECT: CPT

## 2018-09-12 PROCEDURE — 99285 EMERGENCY DEPT VISIT HI MDM: CPT | Mod: 25

## 2018-09-12 PROCEDURE — 99238 HOSP IP/OBS DSCHRG MGMT 30/<: CPT

## 2018-09-12 PROCEDURE — 96374 THER/PROPH/DIAG INJ IV PUSH: CPT

## 2018-09-12 PROCEDURE — 83880 ASSAY OF NATRIURETIC PEPTIDE: CPT

## 2018-09-12 PROCEDURE — C1887: CPT

## 2018-09-12 RX ORDER — HEPARIN SODIUM 5000 [USP'U]/ML
1250 INJECTION INTRAVENOUS; SUBCUTANEOUS
Qty: 25000 | Refills: 0 | Status: DISCONTINUED | OUTPATIENT
Start: 2018-09-12 | End: 2018-09-12

## 2018-09-12 RX ORDER — METOPROLOL TARTRATE 50 MG
0.5 TABLET ORAL
Qty: 30 | Refills: 2
Start: 2018-09-12 | End: 2018-12-10

## 2018-09-12 RX ORDER — ATORVASTATIN CALCIUM 80 MG/1
1 TABLET, FILM COATED ORAL
Qty: 30 | Refills: 2
Start: 2018-09-12 | End: 2018-12-10

## 2018-09-12 RX ORDER — POTASSIUM CHLORIDE 20 MEQ
40 PACKET (EA) ORAL ONCE
Qty: 0 | Refills: 0 | Status: DISCONTINUED | OUTPATIENT
Start: 2018-09-12 | End: 2018-09-12

## 2018-09-12 RX ORDER — ASPIRIN/CALCIUM CARB/MAGNESIUM 324 MG
1 TABLET ORAL
Qty: 30 | Refills: 11
Start: 2018-09-12 | End: 2019-09-06

## 2018-09-12 RX ORDER — METOPROLOL TARTRATE 50 MG
1 TABLET ORAL
Qty: 0 | Refills: 2 | DISCHARGE
Start: 2018-09-12 | End: 2018-12-10

## 2018-09-12 RX ORDER — SODIUM CHLORIDE 9 MG/ML
500 INJECTION INTRAMUSCULAR; INTRAVENOUS; SUBCUTANEOUS
Qty: 0 | Refills: 0 | Status: DISCONTINUED | OUTPATIENT
Start: 2018-09-12 | End: 2018-09-12

## 2018-09-12 RX ORDER — CLOPIDOGREL BISULFATE 75 MG/1
1 TABLET, FILM COATED ORAL
Qty: 30 | Refills: 0
Start: 2018-09-12 | End: 2018-10-11

## 2018-09-12 RX ORDER — ACETAMINOPHEN 500 MG
650 TABLET ORAL ONCE
Qty: 0 | Refills: 0 | Status: DISCONTINUED | OUTPATIENT
Start: 2018-09-12 | End: 2018-09-12

## 2018-09-12 RX ADMIN — Medication 12.5 MILLIGRAM(S): at 06:56

## 2018-09-12 RX ADMIN — CLOPIDOGREL BISULFATE 75 MILLIGRAM(S): 75 TABLET, FILM COATED ORAL at 07:58

## 2018-09-12 RX ADMIN — HEPARIN SODIUM 1250 UNIT(S)/HR: 5000 INJECTION INTRAVENOUS; SUBCUTANEOUS at 08:27

## 2018-09-12 RX ADMIN — Medication 0.25 MILLIGRAM(S): at 00:02

## 2018-09-12 RX ADMIN — AMLODIPINE BESYLATE 5 MILLIGRAM(S): 2.5 TABLET ORAL at 06:56

## 2018-09-12 RX ADMIN — SODIUM CHLORIDE 75 MILLILITER(S): 9 INJECTION INTRAMUSCULAR; INTRAVENOUS; SUBCUTANEOUS at 22:05

## 2018-09-12 RX ADMIN — Medication 1 CAPSULE(S): at 06:56

## 2018-09-12 RX ADMIN — Medication 325 MILLIGRAM(S): at 07:58

## 2018-09-12 RX ADMIN — HEPARIN SODIUM 1100 UNIT(S)/HR: 5000 INJECTION INTRAVENOUS; SUBCUTANEOUS at 00:03

## 2018-09-12 NOTE — DISCHARGE NOTE ADULT - PATIENT PORTAL LINK FT
You can access the [facility] Patient Portal, offered by [facility], by registering with the following website: [email or web address]

## 2018-09-12 NOTE — DISCHARGE NOTE ADULT - NS AS DC AMI ACE CONTRA
Patient on Beta blocker, norvasc, and Dyazide will not tolerate addition of ACE/other reasons documented by Physician / Nurse Practitioner / Physician Assistant  or Pharmacist for not prescribing an ACEI AND not prescibing an ARB at discharge

## 2018-09-12 NOTE — DISCHARGE NOTE ADULT - CARE PROVIDER_API CALL
Jenny Casey), Internal Medicine  [address] [id number]  Phone: [phone]  Fax: [phone]

## 2018-09-12 NOTE — DISCHARGE NOTE ADULT - INSTRUCTIONS
You underwent a coronary angiogram and should wait 3 days before returning to ordinary activities. Do not drive for 2 days. Consult your doctor before returning to vigorous activity. You may return to work in 3-5 days. The catheter from your right wrist was removed and you should remove the dressing in 24 hours. You may shower once the dressing is removed, but avoid baths, hot tubs, or swimming for 5 days to prevent infection. If you notice bleeding from the site, hardening and pain at the site, drainage or redness from the site, coolness/paleness of the right arm, weakness/paralysis of right arm (unable to lift or move),  swelling, or fever, please call [phone]. Please continue your aspirin and plavix as prescribed unless otherwise indicated by your cardiologist. All of your prescriptions have been sent to the pharmacy. Please follow up with Dr. Casey in 1 week.

## 2018-09-12 NOTE — DISCHARGE NOTE ADULT - MEDICATION SUMMARY - MEDICATIONS TO TAKE
I will START or STAY ON the medications listed below when I get home from the hospital:    Aspirin Enteric Coated 81 mg oral delayed release tablet  -- 1 tab(s) by mouth once a day  -- Indication: For NSTEMI (Heart Attack) ,     atorvastatin 80 mg oral tablet  -- 1 tab(s) by mouth once a day (at bedtime)  -- Indication: For Hyperlipidemia (Cholesterol), NSTEMI (Heart Attack) ,     triamterene-hydrochlorothiazide 37.5 mg-25 mg oral tablet  -- 1 tab(s) by mouth once a day  -- Indication: For Hypertension (Blood Pressure)    clopidogrel 75 mg oral tablet  -- 1 tab(s) by mouth once a day  -- Indication: For NSTEMI (Heart Attack) ,     Metoprolol Tartrate 25 mg oral tablet  -- 0.5 tab(s) by mouth 2 times a day  -- Indication: For NSTEMI (Heart Attack) , Hypertension (Blood Pressure)    amLODIPine 5 mg oral tablet  -- 1 tab(s) by mouth once a day  -- Indication: For Hypertension (Blood Pressure)

## 2018-09-12 NOTE — DISCHARGE NOTE ADULT - PLAN OF CARE
Maintain Low Fat, Low Salt and Low Cholesterol Diet. You had a cardiac angiogram which showed one blockage (50-60%) that did not require stenting. Continue Aspirin 81 mg By Mouth Once Daily. You will be on Plavix x 1 month. You were started on Atorvastatin 80 mg By Mouth Once Daily bedtime. Have Liver Function and Cholesterol Levels checked in 1 month. You were also started on metoprolol 25 mg (1/2 tab) By Mouth twice daily (12.5 mg By Mouth Twice Daily) Maintain Low Salt Diet Less than 2000 mg daily. Continue Amlodipine and Triamterence/HCTZ. Monitor BP.

## 2018-09-12 NOTE — DISCHARGE NOTE ADULT - HOSPITAL COURSE
49 yo obese female  with a PMhx HTN, Sickle Cell Trait,  ventriculomegaly initially presented to a clinic today (9/11/2018) due to ongoing chest pain. Patient reports she was doing some weight lifting and planks in her office around noon today and felt suddenly flushed and developed  substernal chest pain, described as pressure, radiating down her left arm associated with palpitations and nausea. Patient notes she was belching/burping and gasping for air when chest pain occured and symptoms self-resolved within several minutes. Patient went to an outpatient clinic,  EMS was called and patient took  mg x one dose in route.  Pt. states she has had similar symptoms a year ago and went to see her PMD who performed an ECG revealing normal finding.  However, pt. never had a stress test or any other cardiac workup. On arrival to [facility] ED; Vital signs stable: BP: 132/86, HR: 80s, RR: 19, Afebrile, and O2: 96% RA. 12 Lead EKG revealed Sinus Rhythm with nonspecific TW changes in V6, I, AVL (CONFRIM). Pertinent lab values revealed include WBC: 12.3K, D Dimer negative, K: 3.1, TSH WNL, BNO 1,233 and Troponin 0.819. ACS Protocol was initiated and patient was loaded with  mg, Plavix 600 mg, Heparin gtt with bolus started and patient received Lipitor 80 mg daily, Lopressor 25 mg PO x one dose, Protonix 40 mg IV x one dose and Kdur 40 mEq orally x one dose. Patient is now transferred to [facility] for management of R/I NSTEMI and plan for cardiac catheterization with possible intervention if clinically indicated. 49 yo obese female  with a PMhx HTN, Sickle Cell Trait,  ventriculomegaly initially presented to a clinic today (9/11/2018) due to ongoing chest pain. Patient reports she was doing some weight lifting and planks in her office around noon today and felt suddenly flushed and developed  substernal chest pain, described as pressure, radiating down her left arm associated with palpitations and nausea. Patient notes she was belching/burping and gasping for air when chest pain occured and symptoms self-resolved within several minutes. Patient went to an outpatient clinic,  EMS was called and patient took  mg x one dose in route.  Pt. states she has had similar symptoms a year ago and went to see her PMD who performed an ECG revealing normal finding.  However, pt. never had a stress test or any other cardiac workup. On arrival to [facility] ED; Vital signs stable: BP: 132/86, HR: 80s, RR: 19, Afebrile, and O2: 96% RA. 12 Lead EKG revealed Sinus Rhythm with nonspecific TW changes in V6, I, AVL (CONFRIM). Pertinent lab values revealed include WBC: 12.3K, D Dimer negative, K: 3.1, TSH WNL, BNP 1,233 and Troponin 0.819. ACS Protocol was initiated and patient was loaded with  mg, Plavix 600 mg, Heparin gtt with bolus started and patient received Lipitor 80 mg daily, Lopressor 25 mg PO x one dose, Protonix 40 mg IV x one dose and Kdur 40 mEq orally x one dose. Patient  transferred to [facility] for management of R/I NSTEMI and plan for cardiac catheterization with possible intervention if clinically indicated. Peak Troponin T 0.26 . Patient underwent diagnostic cardiac cath 9/12 revealing distal apical LAD 50-60% (small vessel), LM RCA and LCx normal, LVEF 55%, LVEDP 14 mm Hg.  Labs and telemetry reviewed. Hypokalemia K+ 3.8 Kdur 40 mEq PO x 1 given. Magnesium 2.0. Patient C/P free and HD stable and cleared for discharge by Dr. Casey. Echocardiogram to be done as an outpatient with Dr. Casey . Patient to follow-up with Dr. Casey in 1 week. Prescriptions for ASA, Plavix, Lipitor, Metoprolol E-Prescribed to [facility] [address].   V/S:	BP: 120’s/80s		HR: 80s	RR:16		Temp: 98.5 F  Monitor-	NSR 		O2 sat- 96% RA   	  GENERAL:  Sitting in bed in no acute distress  CVS: + S1 S2. RRR. No murmurs, rubs or gallops.   Pulm: CTA Bilaterally. No rhonchi, wheezes, or rales.  Abd: BS x 4. Soft NT/ND.  Ext: No clubbing, cyanosis, or edema BLE. No calf tenderness BLE.   Right Radial: Radial Pulse 2+. No hematoma or bleed. Sensation Intact 5/5  Strength.

## 2018-09-12 NOTE — DISCHARGE NOTE ADULT - CARE PLAN
Principal Discharge DX:	NSTEMI (non-ST elevated myocardial infarction)  Goal:	Maintain Low Fat, Low Salt and Low Cholesterol Diet.  Assessment and plan of treatment:	You had a cardiac angiogram which showed one blockage (50-60%) that did not require stenting. Continue Aspirin 81 mg By Mouth Once Daily. You will be on Plavix x 1 month. You were started on Atorvastatin 80 mg By Mouth Once Daily bedtime. Have Liver Function and Cholesterol Levels checked in 1 month. You were also started on metoprolol 25 mg (1/2 tab) By Mouth twice daily (12.5 mg By Mouth Twice Daily)  Secondary Diagnosis:	HTN (hypertension)  Assessment and plan of treatment:	Maintain Low Salt Diet Less than 2000 mg daily. Continue Amlodipine and Triamterence/HCTZ. Monitor BP.

## 2018-09-12 NOTE — PROVIDER CONTACT NOTE (CRITICAL VALUE NOTIFICATION) - BACKGROUND
Pt admitted with complaints of CP with elevated trop x2; is receiving heparin drip @ 11ml/hr. NPO for possible cath in am.

## 2018-09-20 PROBLEM — I10 ESSENTIAL (PRIMARY) HYPERTENSION: Chronic | Status: ACTIVE | Noted: 2018-09-11

## 2018-09-21 PROBLEM — Z00.00 ENCOUNTER FOR PREVENTIVE HEALTH EXAMINATION: Status: ACTIVE | Noted: 2018-09-21

## 2018-09-27 ENCOUNTER — APPOINTMENT (OUTPATIENT)
Dept: HEART AND VASCULAR | Facility: CLINIC | Age: 48
End: 2018-09-27

## 2018-10-11 ENCOUNTER — APPOINTMENT (OUTPATIENT)
Dept: HEART AND VASCULAR | Facility: CLINIC | Age: 48
End: 2018-10-11
Payer: COMMERCIAL

## 2018-10-11 VITALS
OXYGEN SATURATION: 98 % | TEMPERATURE: 97.9 F | WEIGHT: 168.98 LBS | BODY MASS INDEX: 33.62 KG/M2 | HEART RATE: 87 BPM | SYSTOLIC BLOOD PRESSURE: 94 MMHG | DIASTOLIC BLOOD PRESSURE: 60 MMHG | HEIGHT: 59.45 IN

## 2018-10-11 PROCEDURE — 99215 OFFICE O/P EST HI 40 MIN: CPT

## 2018-10-11 PROCEDURE — 93000 ELECTROCARDIOGRAM COMPLETE: CPT

## 2018-10-11 PROCEDURE — 99401 PREV MED CNSL INDIV APPRX 15: CPT

## 2018-10-11 RX ORDER — CHLORHEXIDINE GLUCONATE 4 %
325 (65 FE) LIQUID (ML) TOPICAL DAILY
Refills: 0 | Status: ACTIVE | COMMUNITY

## 2018-10-11 RX ORDER — AMLODIPINE BESYLATE 5 MG/1
5 TABLET ORAL DAILY
Qty: 1 | Refills: 0 | Status: ACTIVE | COMMUNITY

## 2018-10-11 RX ORDER — ASPIRIN ENTERIC COATED TABLETS 81 MG 81 MG/1
81 TABLET, DELAYED RELEASE ORAL DAILY
Qty: 1 | Refills: 0 | Status: ACTIVE | COMMUNITY

## 2018-10-22 ENCOUNTER — APPOINTMENT (OUTPATIENT)
Dept: HEART AND VASCULAR | Facility: CLINIC | Age: 48
End: 2018-10-22
Payer: COMMERCIAL

## 2018-10-22 PROCEDURE — 93306 TTE W/DOPPLER COMPLETE: CPT

## 2018-10-29 ENCOUNTER — APPOINTMENT (OUTPATIENT)
Dept: HEART AND VASCULAR | Facility: CLINIC | Age: 48
End: 2018-10-29
Payer: COMMERCIAL

## 2018-10-29 VITALS
DIASTOLIC BLOOD PRESSURE: 85 MMHG | BODY MASS INDEX: 33.22 KG/M2 | SYSTOLIC BLOOD PRESSURE: 120 MMHG | TEMPERATURE: 97.9 F | WEIGHT: 167 LBS | HEART RATE: 95 BPM | OXYGEN SATURATION: 98 % | HEIGHT: 59.45 IN

## 2018-10-29 PROCEDURE — 99401 PREV MED CNSL INDIV APPRX 15: CPT

## 2018-10-29 PROCEDURE — 93000 ELECTROCARDIOGRAM COMPLETE: CPT

## 2018-10-29 PROCEDURE — 99215 OFFICE O/P EST HI 40 MIN: CPT

## 2018-12-31 ENCOUNTER — APPOINTMENT (OUTPATIENT)
Dept: HEART AND VASCULAR | Facility: CLINIC | Age: 48
End: 2018-12-31
Payer: COMMERCIAL

## 2018-12-31 VITALS
BODY MASS INDEX: 31.83 KG/M2 | DIASTOLIC BLOOD PRESSURE: 70 MMHG | HEIGHT: 59.45 IN | OXYGEN SATURATION: 98 % | HEART RATE: 104 BPM | WEIGHT: 159.99 LBS | TEMPERATURE: 98.7 F | SYSTOLIC BLOOD PRESSURE: 100 MMHG

## 2018-12-31 PROCEDURE — 93000 ELECTROCARDIOGRAM COMPLETE: CPT

## 2018-12-31 PROCEDURE — 99214 OFFICE O/P EST MOD 30 MIN: CPT

## 2018-12-31 RX ORDER — CLOPIDOGREL BISULFATE 75 MG/1
75 TABLET, FILM COATED ORAL DAILY
Qty: 1 | Refills: 0 | Status: COMPLETED | COMMUNITY
End: 2018-12-31

## 2018-12-31 NOTE — HISTORY OF PRESENT ILLNESS
[FreeTextEntry1] : 49 yo obese female  with a PMhx HTN, Sickle Cell Trait,  ventriculomegaly \par initially presented to a clinic today (9/11/2018) due to ongoing chest pain. 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 Patient notes she was belching/burping and gasping for \par air when chest pain occurred and symptoms self-resolved within several minutes. \par Patient went to an outpatient clinic,  EMS was called and patient took  \par mg x one dose in route.  Pt. states she has had similar symptoms a year ago and \par went to see her PMD who performed an ECG revealing normal finding.  However, \par pt. never had a stress test or any other cardiac workup. On arrival to [facility] ED; \par Vital signs stable: BP: 132/86, HR: 80s, RR: 19, Afebrile, and O2: 96% RA. 12 \par Lead EKG revealed Sinus Rhythm with nonspecific TW changes in V6, I, AVL \par (CONFRIM). Pertinent lab values revealed include WBC: 12.3K, D Dimer negative, \par K: 3.1, TSH WNL, BNP 1,233 and Troponin 0.819. ACS Protocol was initiated and \par patient was loaded with  mg, Plavix 600 mg, Heparin gtt with bolus \par started and patient received Lipitor 80 mg daily, Lopressor 25 mg PO x one \par dose, Protonix 40 mg IV x one dose and Kdur 40 mEq orally x one dose. Patient \par transferred to [facility] for management of R/I NSTEMI and plan for cardiac \par catheterization with possible intervention if clinically indicated. Peak \par Troponin T 0.26 . Patient underwent diagnostic cardiac cath 9/12 \par revealing distal apical LAD 50-60% (small vessel), LM RCA and LCx normal, LVEF \par 55%, LVEDP 14 mm Hg. \par \par USOH EKG unchanged echo reviewed

## 2018-12-31 NOTE — DISCUSSION/SUMMARY
[FreeTextEntry1] : The number of diagnostic and/or management options \par NSTEMI\par  Patient underwent diagnostic cardiac cath 9/12 \par revealing distal apical LAD 50-60% (small vessel), LM RCA and LCx normal, LVEF \par 55%, LVEDP 14 mm Hg. \par s/p 1 month dapt no pci - okay to stop plavix\par will get 2D echo given q waves across precordium to eval EF\par \par HTN - continue current meds\par check 2d echo\par \par \par Labs, radiology: ekg\par \par Discussion of the case with another healthcare provider: na\par \par Overall Risk: High\par  \par 15min additional detailed discussion regarding prevention including but not limiting to goal SBP<130mmHg, Mediterranean diet discussion, No salt diet, diabetes screening, and goal LDL<100. Smoking cessation, EtOH use and depression screen where applicable\par Exercise counseling and plan discussed with patient\par will readdress and reinforce prevention in subsequent visits

## 2019-02-06 ENCOUNTER — APPOINTMENT (OUTPATIENT)
Dept: HEART AND VASCULAR | Facility: CLINIC | Age: 49
End: 2019-02-06

## 2019-02-20 ENCOUNTER — APPOINTMENT (OUTPATIENT)
Dept: HEART AND VASCULAR | Facility: CLINIC | Age: 49
End: 2019-02-20
Payer: COMMERCIAL

## 2019-02-20 VITALS
HEART RATE: 100 BPM | OXYGEN SATURATION: 98 % | DIASTOLIC BLOOD PRESSURE: 62 MMHG | BODY MASS INDEX: 30.63 KG/M2 | WEIGHT: 153.99 LBS | HEIGHT: 59.5 IN | TEMPERATURE: 97.7 F | SYSTOLIC BLOOD PRESSURE: 92 MMHG

## 2019-02-20 PROCEDURE — 99214 OFFICE O/P EST MOD 30 MIN: CPT | Mod: 25

## 2019-02-20 PROCEDURE — 99358 PROLONG SERVICE W/O CONTACT: CPT | Mod: 25

## 2019-02-20 PROCEDURE — 93000 ELECTROCARDIOGRAM COMPLETE: CPT

## 2019-02-20 PROCEDURE — 99366 TEAM CONF W/PAT BY HC PROF: CPT | Mod: 25

## 2019-02-20 NOTE — HISTORY OF PRESENT ILLNESS
[FreeTextEntry1] : 49 yo obese female  with a PMhx HTN, Sickle Cell Trait,  ventriculomegaly \par initially presented to a clinic today (9/11/2018) due to ongoing chest pain. 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 Patient notes she was belching/burping and gasping for \par air when chest pain occurred and symptoms self-resolved within several minutes. \par Patient went to an outpatient clinic,  EMS was called and patient took  \par mg x one dose in route.  Pt. states she has had similar symptoms a year ago and \par went to see her PMD who performed an ECG revealing normal finding.  However, \par pt. never had a stress test or any other cardiac workup. On arrival to [facility] ED; \par Vital signs stable: BP: 132/86, HR: 80s, RR: 19, Afebrile, and O2: 96% RA. 12 \par Lead EKG revealed Sinus Rhythm with nonspecific TW changes in V6, I, AVL \par (CONFRIM). Pertinent lab values revealed include WBC: 12.3K, D Dimer negative, \par K: 3.1, TSH WNL, BNP 1,233 and Troponin 0.819. ACS Protocol was initiated and \par patient was loaded with  mg, Plavix 600 mg, Heparin gtt with bolus \par started and patient received Lipitor 80 mg daily, Lopressor 25 mg PO x one \par dose, Protonix 40 mg IV x one dose and Kdur 40 mEq orally x one dose. Patient \par transferred to [facility] for management of R/I NSTEMI and plan for cardiac \par catheterization with possible intervention if clinically indicated. Peak \par Troponin T 0.26 . Patient underwent diagnostic cardiac cath 9/12 \par revealing distal apical LAD 50-60% (small vessel), LM RCA and LCx normal, LVEF \par 55%, LVEDP 14 mm Hg. \par \par USOH EKG unchanged echo reviewed\par \par 2/20/19 \par jan 31 [place] ed fro eyes occulatiosn and slurred speech\par USOH, 100% compliant with meds\par location: chest\par duration: na\par  modifying factors: na\par timing: na\par severity: 0/10\par EKG unchanged from prior (in chart)\par consultation notes reviewed where appropriate\par

## 2019-02-20 NOTE — PHYSICAL EXAM
[General Appearance - Well Developed] : well developed [Normal Appearance] : normal appearance [Well Groomed] : well groomed [General Appearance - Well Nourished] : well nourished [No Deformities] : no deformities [General Appearance - In No Acute Distress] : no acute distress [Normal Conjunctiva] : the conjunctiva exhibited no abnormalities [Eyelids - No Xanthelasma] : the eyelids demonstrated no xanthelasmas [Normal Oral Mucosa] : normal oral mucosa [No Oral Pallor] : no oral pallor [No Oral Cyanosis] : no oral cyanosis [Normal Jugular Venous A Waves Present] : normal jugular venous A waves present [Normal Jugular Venous V Waves Present] : normal jugular venous V waves present [No Jugular Venous Forbes A Waves] : no jugular venous forbes A waves [Respiration, Rhythm And Depth] : normal respiratory rhythm and effort [Exaggerated Use Of Accessory Muscles For Inspiration] : no accessory muscle use [Auscultation Breath Sounds / Voice Sounds] : lungs were clear to auscultation bilaterally [Heart Rate And Rhythm] : heart rate and rhythm were normal [Heart Sounds] : normal S1 and S2 [Murmurs] : no murmurs present [Abdomen Soft] : soft [Abdomen Tenderness] : non-tender [Abdomen Mass (___ Cm)] : no abdominal mass palpated [Abnormal Walk] : normal gait [Gait - Sufficient For Exercise Testing] : the gait was sufficient for exercise testing [Nail Clubbing] : no clubbing of the fingernails [Cyanosis, Localized] : no localized cyanosis [Petechial Hemorrhages (___cm)] : no petechial hemorrhages [Skin Color & Pigmentation] : normal skin color and pigmentation [] : no rash [No Venous Stasis] : no venous stasis [Skin Lesions] : no skin lesions [No Skin Ulcers] : no skin ulcer [No Xanthoma] : no  xanthoma was observed [Oriented To Time, Place, And Person] : oriented to person, place, and time [Affect] : the affect was normal [Mood] : the mood was normal [No Anxiety] : not feeling anxious

## 2019-02-20 NOTE — DISCUSSION/SUMMARY
[FreeTextEntry1] : The number of diagnostic and/or management options \par NSTEMI\par  Patient underwent diagnostic cardiac cath 9/12 \par revealing distal apical LAD 50-60% (small vessel), LM RCA and LCx normal, LVEF \par 55%, LVEDP 14 mm Hg. \par s/p 1 month dapt no pci - okay to stop plavix\par 2d echo noraml ef pressures valves\par \par CVA -lneuro consult\par \par HTN - continue current meds\par 2d echo noraml ef pressures valves\par \par \par Labs, radiology: ekg echo\par \par Discussion of the case with another healthcare provider: Cameron\par \par 60 min review of hospital records prior to patients visit to assist in the exam. \par \par Overall Risk: High\par  \par 15min additional detailed discussion regarding prevention including but not limiting to goal SBP<130mmHg, Mediterranean diet discussion, No salt diet, diabetes screening, and goal LDL<100. Smoking cessation, EtOH use and depression screen where applicable\par Exercise counseling and plan discussed with patient\par will readdress and reinforce prevention in subsequent visits.

## 2019-02-26 ENCOUNTER — RX RENEWAL (OUTPATIENT)
Age: 49
End: 2019-02-26

## 2019-02-26 RX ORDER — ATORVASTATIN CALCIUM 80 MG/1
80 TABLET, FILM COATED ORAL DAILY
Qty: 90 | Refills: 3 | Status: ACTIVE | COMMUNITY
Start: 1900-01-01 | End: 1900-01-01

## 2019-02-26 RX ORDER — METOPROLOL TARTRATE 25 MG/1
25 TABLET, FILM COATED ORAL TWICE DAILY
Qty: 2 | Refills: 0 | Status: ACTIVE | COMMUNITY
Start: 1900-01-01 | End: 1900-01-01

## 2019-03-21 ENCOUNTER — APPOINTMENT (OUTPATIENT)
Dept: NEUROLOGY | Facility: CLINIC | Age: 49
End: 2019-03-21
Payer: COMMERCIAL

## 2019-03-21 VITALS
SYSTOLIC BLOOD PRESSURE: 117 MMHG | WEIGHT: 156 LBS | HEIGHT: 59 IN | OXYGEN SATURATION: 96 % | BODY MASS INDEX: 31.45 KG/M2 | TEMPERATURE: 97.4 F | HEART RATE: 95 BPM | DIASTOLIC BLOOD PRESSURE: 82 MMHG

## 2019-03-21 PROCEDURE — 99205 OFFICE O/P NEW HI 60 MIN: CPT

## 2019-03-21 RX ORDER — CLOPIDOGREL BISULFATE 75 MG/1
75 TABLET, FILM COATED ORAL DAILY
Qty: 30 | Refills: 2 | Status: ACTIVE | COMMUNITY
Start: 2019-03-21 | End: 1900-01-01

## 2019-03-28 ENCOUNTER — APPOINTMENT (OUTPATIENT)
Dept: HEART AND VASCULAR | Facility: CLINIC | Age: 49
End: 2019-03-28
Payer: COMMERCIAL

## 2019-03-28 VITALS
HEIGHT: 58.66 IN | SYSTOLIC BLOOD PRESSURE: 108 MMHG | DIASTOLIC BLOOD PRESSURE: 80 MMHG | WEIGHT: 159.99 LBS | OXYGEN SATURATION: 96 % | BODY MASS INDEX: 32.69 KG/M2 | HEART RATE: 102 BPM | TEMPERATURE: 97 F

## 2019-03-28 PROCEDURE — 93000 ELECTROCARDIOGRAM COMPLETE: CPT

## 2019-03-28 PROCEDURE — 99214 OFFICE O/P EST MOD 30 MIN: CPT

## 2019-03-28 NOTE — HISTORY OF PRESENT ILLNESS
[FreeTextEntry1] : 47 yo obese female  with a PMhx HTN, Sickle Cell Trait,  ventriculomegaly \par initially presented to a clinic today (9/11/2018) due to ongoing chest pain. 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 Patient notes she was belching/burping and gasping for \par air when chest pain occurred and symptoms self-resolved within several minutes. \par Patient went to an outpatient clinic,  EMS was called and patient took  \par mg x one dose in route.  Pt. states she has had similar symptoms a year ago and \par went to see her PMD who performed an ECG revealing normal finding.  However, \par pt. never had a stress test or any other cardiac workup. On arrival to [facility] ED; \par Vital signs stable: BP: 132/86, HR: 80s, RR: 19, Afebrile, and O2: 96% RA. 12 \par Lead EKG revealed Sinus Rhythm with nonspecific TW changes in V6, I, AVL \par (CONFRIM). Pertinent lab values revealed include WBC: 12.3K, D Dimer negative, \par K: 3.1, TSH WNL, BNP 1,233 and Troponin 0.819. ACS Protocol was initiated and \par patient was loaded with  mg, Plavix 600 mg, Heparin gtt with bolus \par started and patient received Lipitor 80 mg daily, Lopressor 25 mg PO x one \par dose, Protonix 40 mg IV x one dose and Kdur 40 mEq orally x one dose. Patient \par transferred to [facility] for management of R/I NSTEMI and plan for cardiac \par catheterization with possible intervention if clinically indicated. Peak \par Troponin T 0.26 . Patient underwent diagnostic cardiac cath 9/12 \par revealing distal apical LAD 50-60% (small vessel), LM RCA and LCx normal, LVEF \par 55%, LVEDP 14 mm Hg. \par \par USOH EKG unchanged echo reviewed\par \par 2/20/19 \par jan 31 [place] ed fro eyes occulatiosn and slurred speech\par USOH, 100% compliant with meds\par location: chest\par duration: na\par  modifying factors: na\par timing: na\par severity: 0/10\par EKG unchanged from prior (in chart)\par consultation notes reviewed where appropriate\par \par 3/28/19 MRI results reviewed, will need top r/o cardiac etiology \par ekg unchanged\par no cp sob\par

## 2019-03-28 NOTE — DISCUSSION/SUMMARY
[FreeTextEntry1] : The number of diagnostic and/or management options \par NSTEMI\par  Patient underwent diagnostic cardiac cath 9/12 \par revealing distal apical LAD 50-60% (small vessel), LM RCA and LCx normal, LVEF \par 55%, LVEDP 14 mm Hg. \par 2d echo noraml ef pressures valves\par \par CVA - resumed plavix\par CHANTELLE \par 14d loop recorder placed\par \par \par HTN - continue current meds\par 2d echo noraml ef pressures valves\par \par \par Labs, radiology: ekg echo\par \par

## 2019-04-05 ENCOUNTER — OUTPATIENT (OUTPATIENT)
Dept: OUTPATIENT SERVICES | Facility: HOSPITAL | Age: 49
LOS: 1 days | End: 2019-04-05
Payer: COMMERCIAL

## 2019-04-05 DIAGNOSIS — D12.6 BENIGN NEOPLASM OF COLON, UNSPECIFIED: Chronic | ICD-10-CM

## 2019-04-05 DIAGNOSIS — I63.9 CEREBRAL INFARCTION, UNSPECIFIED: ICD-10-CM

## 2019-04-05 DIAGNOSIS — Q04.8 OTHER SPECIFIED CONGENITAL MALFORMATIONS OF BRAIN: Chronic | ICD-10-CM

## 2019-04-05 PROCEDURE — 93312 ECHO TRANSESOPHAGEAL: CPT

## 2019-04-05 PROCEDURE — 93320 DOPPLER ECHO COMPLETE: CPT | Mod: 26

## 2019-04-05 PROCEDURE — 93312 ECHO TRANSESOPHAGEAL: CPT | Mod: 26

## 2019-04-05 PROCEDURE — 93325 DOPPLER ECHO COLOR FLOW MAPG: CPT | Mod: 26

## 2019-04-10 ENCOUNTER — APPOINTMENT (OUTPATIENT)
Dept: HEART AND VASCULAR | Facility: CLINIC | Age: 49
End: 2019-04-10
Payer: COMMERCIAL

## 2019-04-10 VITALS
HEART RATE: 108 BPM | HEIGHT: 58.66 IN | WEIGHT: 157.98 LBS | TEMPERATURE: 98.2 F | BODY MASS INDEX: 32.28 KG/M2 | SYSTOLIC BLOOD PRESSURE: 136 MMHG | DIASTOLIC BLOOD PRESSURE: 80 MMHG

## 2019-04-10 PROCEDURE — 93000 ELECTROCARDIOGRAM COMPLETE: CPT

## 2019-04-10 PROCEDURE — 99214 OFFICE O/P EST MOD 30 MIN: CPT

## 2019-04-12 NOTE — DISCUSSION/SUMMARY
[FreeTextEntry1] : The number of diagnostic and/or management options \par CAD\par  Patient underwent diagnostic cardiac cath 9/12 \par revealing distal apical LAD 50-60% (small vessel), LM RCA and LCx normal, LVEF \par 55%, LVEDP 14 mm Hg. \par s/p 1 month dapt no pci - okay to stop plavix\par 2d echo noraml ef pressures valves\par \par CM\par -restrictive, will get [facility]RI heme/onc c/s and adv HF consult\par may need endomycardial biopsy for congo staining\par \par CVA -has eventm onitor givel enlarged LA from restriction, high priobability Afib - once identifies will iniate AC\par \par HTN - continue current meds\par 2d echo noraml ef pressures valves\par \par \par Labs, radiology: ekg echo\par

## 2019-04-12 NOTE — HISTORY OF PRESENT ILLNESS
[FreeTextEntry1] : 49 yo obese female  with a PMhx HTN, Sickle Cell Trait,  ventriculomegaly \par initially presented to a clinic today (9/11/2018) due to ongoing chest pain. 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 Patient notes she was belching/burping and gasping for \par air when chest pain occurred and symptoms self-resolved within several minutes. \par Patient went to an outpatient clinic,  EMS was called and patient took  \par mg x one dose in route.  Pt. states she has had similar symptoms a year ago and \par went to see her PMD who performed an ECG revealing normal finding.  However, \par pt. never had a stress test or any other cardiac workup. On arrival to [facility] ED; \par Vital signs stable: BP: 132/86, HR: 80s, RR: 19, Afebrile, and O2: 96% RA. 12 \par Lead EKG revealed Sinus Rhythm with nonspecific TW changes in V6, I, AVL \par (CONFRIM). Pertinent lab values revealed include WBC: 12.3K, D Dimer negative, \par K: 3.1, TSH WNL, BNP 1,233 and Troponin 0.819. ACS Protocol was initiated and \par patient was loaded with  mg, Plavix 600 mg, Heparin gtt with bolus \par started and patient received Lipitor 80 mg daily, Lopressor 25 mg PO x one \par dose, Protonix 40 mg IV x one dose and Kdur 40 mEq orally x one dose. Patient \par transferred to [facility] for management of R/I NSTEMI and plan for cardiac \par catheterization with possible intervention if clinically indicated. Peak \par Troponin T 0.26 . Patient underwent diagnostic cardiac cath 9/12 \par revealing distal apical LAD 50-60% (small vessel), LM RCA and LCx normal, LVEF \par 55%, LVEDP 14 mm Hg. \par \par USOH EKG unchanged echo reviewed\par \par 2/20/19 \par jan 31 [place] ed fro eyes occulatiosn and slurred speech\par USOH, 100% compliant with meds\par location: chest\par duration: na\par  modifying factors: na\par timing: na\par severity: 0/10\par EKG unchanged from prior (in chart)\par consultation notes reviewed where appropriate\par \par 3/28/19 MRI results reviewed, will need to r/o cardiac etiology \par ekg unchanged\par no cp sob\par \par 4/12/19 has eventm onitor on to eval for afib.  ekg nsr low vltage\par edu with biventricularthickening and biatrial enlargement suggestive of restrictive cm - posibe amyliod\par

## 2019-04-12 NOTE — PHYSICAL EXAM
[General Appearance - Well Developed] : well developed [Normal Appearance] : normal appearance [Well Groomed] : well groomed [No Deformities] : no deformities [General Appearance - Well Nourished] : well nourished [General Appearance - In No Acute Distress] : no acute distress [Normal Conjunctiva] : the conjunctiva exhibited no abnormalities [Eyelids - No Xanthelasma] : the eyelids demonstrated no xanthelasmas [Normal Oral Mucosa] : normal oral mucosa [No Oral Pallor] : no oral pallor [No Oral Cyanosis] : no oral cyanosis [Normal Jugular Venous A Waves Present] : normal jugular venous A waves present [Normal Jugular Venous V Waves Present] : normal jugular venous V waves present [No Jugular Venous Forbes A Waves] : no jugular venous forbes A waves [Respiration, Rhythm And Depth] : normal respiratory rhythm and effort [Exaggerated Use Of Accessory Muscles For Inspiration] : no accessory muscle use [Auscultation Breath Sounds / Voice Sounds] : lungs were clear to auscultation bilaterally [Heart Rate And Rhythm] : heart rate and rhythm were normal [Murmurs] : no murmurs present [Heart Sounds] : normal S1 and S2 [Abdomen Soft] : soft [Abdomen Tenderness] : non-tender [Abdomen Mass (___ Cm)] : no abdominal mass palpated [Abnormal Walk] : normal gait [Nail Clubbing] : no clubbing of the fingernails [Gait - Sufficient For Exercise Testing] : the gait was sufficient for exercise testing [Cyanosis, Localized] : no localized cyanosis [Petechial Hemorrhages (___cm)] : no petechial hemorrhages [Skin Color & Pigmentation] : normal skin color and pigmentation [] : no rash [No Venous Stasis] : no venous stasis [Skin Lesions] : no skin lesions [No Skin Ulcers] : no skin ulcer [No Xanthoma] : no  xanthoma was observed [Oriented To Time, Place, And Person] : oriented to person, place, and time [Affect] : the affect was normal [No Anxiety] : not feeling anxious [Mood] : the mood was normal

## 2019-04-16 ENCOUNTER — APPOINTMENT (OUTPATIENT)
Dept: HEMATOLOGY ONCOLOGY | Facility: CLINIC | Age: 49
End: 2019-04-16
Payer: COMMERCIAL

## 2019-04-16 VITALS
RESPIRATION RATE: 14 BRPM | SYSTOLIC BLOOD PRESSURE: 116 MMHG | TEMPERATURE: 97.7 F | OXYGEN SATURATION: 96 % | DIASTOLIC BLOOD PRESSURE: 88 MMHG | HEART RATE: 96 BPM

## 2019-04-16 VITALS — BODY MASS INDEX: 31.25 KG/M2 | HEIGHT: 59 IN | WEIGHT: 155 LBS

## 2019-04-16 PROCEDURE — 36415 COLL VENOUS BLD VENIPUNCTURE: CPT

## 2019-04-16 PROCEDURE — 99204 OFFICE O/P NEW MOD 45 MIN: CPT | Mod: 25

## 2019-04-16 RX ORDER — BETAMETHASONE DIPROPIONATE 0.5 MG/G
0.05 LOTION TOPICAL
Qty: 60 | Refills: 0 | Status: ACTIVE | COMMUNITY
Start: 2018-11-29

## 2019-04-16 RX ORDER — ACETAMINOPHEN/DIPHENHYDRAMINE 500MG-25MG
1000 TABLET ORAL
Refills: 0 | Status: ACTIVE | COMMUNITY

## 2019-04-16 RX ORDER — KETOCONAZOLE 20.5 MG/ML
2 SHAMPOO, SUSPENSION TOPICAL
Qty: 120 | Refills: 0 | Status: ACTIVE | COMMUNITY
Start: 2018-02-27

## 2019-04-18 LAB
25(OH)D3 SERPL-MCNC: 48.8 NG/ML
ALBUMIN SERPL ELPH-MCNC: 4.2 G/DL
ALP BLD-CCNC: 85 U/L
ALT SERPL-CCNC: 64 U/L
ANION GAP SERPL CALC-SCNC: 19 MMOL/L
AST SERPL-CCNC: 45 U/L
B2 MICROGLOB SERPL-MCNC: 4 MG/L
BASOPHILS # BLD AUTO: 0.07 K/UL
BASOPHILS NFR BLD AUTO: 0.7 %
BILIRUB SERPL-MCNC: 0.7 MG/DL
BUN SERPL-MCNC: 23 MG/DL
CALCIUM SERPL-MCNC: 10.1 MG/DL
CHLORIDE SERPL-SCNC: 103 MMOL/L
CO2 SERPL-SCNC: 22 MMOL/L
CREAT SERPL-MCNC: 1.16 MG/DL
EOSINOPHIL # BLD AUTO: 0.07 K/UL
EOSINOPHIL NFR BLD AUTO: 0.7 %
ERYTHROCYTE [SEDIMENTATION RATE] IN BLOOD BY WESTERGREN METHOD: 2 MM/HR
FERRITIN SERPL-MCNC: 143 NG/ML
GLUCOSE SERPL-MCNC: 109 MG/DL
HAPTOGLOB SERPL-MCNC: 141 MG/DL
HCT VFR BLD CALC: 53.1 %
HGB BLD-MCNC: 17.3 G/DL
IMM GRANULOCYTES NFR BLD AUTO: 0.2 %
IRON SATN MFR SERPL: 60 %
IRON SERPL-MCNC: 227 UG/DL
LDH SERPL-CCNC: 328 U/L
LYMPHOCYTES # BLD AUTO: 2.97 K/UL
LYMPHOCYTES NFR BLD AUTO: 30.4 %
MAN DIFF?: NORMAL
MCHC RBC-ENTMCNC: 27.4 PG
MCHC RBC-ENTMCNC: 32.6 GM/DL
MCV RBC AUTO: 84 FL
MONOCYTES # BLD AUTO: 0.79 K/UL
MONOCYTES NFR BLD AUTO: 8.1 %
NEUTROPHILS # BLD AUTO: 5.85 K/UL
NEUTROPHILS NFR BLD AUTO: 59.9 %
PLATELET # BLD AUTO: 350 K/UL
POTASSIUM SERPL-SCNC: 4.4 MMOL/L
PROT SERPL-MCNC: 6.6 G/DL
RBC # BLD: 6.32 M/UL
RBC # FLD: 17.9 %
SODIUM SERPL-SCNC: 144 MMOL/L
TIBC SERPL-MCNC: 378 UG/DL
UIBC SERPL-MCNC: 151 UG/DL
VIT B12 SERPL-MCNC: 1791 PG/ML
WBC # FLD AUTO: 9.77 K/UL

## 2019-04-18 NOTE — REASON FOR VISIT
[Initial Consultation] : an initial consultation for [Blood Count Assessment] : blood count assessment [FreeTextEntry2] : r/o amyloidosis

## 2019-04-18 NOTE — ASSESSMENT
[FreeTextEntry1] : Repeat blood work pt has an elevated Hb? and increased Lambda light chain which can be suggestive of AL amyloidosis...\par \par Planning to do BM aspirate and Bx to confirm.\par

## 2019-04-18 NOTE — CONSULT LETTER
[Dear  ___] : Dear  [unfilled], [Consult Letter:] : I had the pleasure of evaluating your patient, [unfilled]. [Please see my note below.] : Please see my note below. [FreeTextEntry3] : Aliyah Lewis MD\par  [Consult Closing:] : Thank you very much for allowing me to participate in the care of this patient.  If you have any questions, please do not hesitate to contact me. [Sincerely,] : Sincerely,

## 2019-04-18 NOTE — CONSULT LETTER
[Dear  ___] : Dear  [unfilled], [Consult Letter:] : I had the pleasure of evaluating your patient, [unfilled]. [Please see my note below.] : Please see my note below. [Consult Closing:] : Thank you very much for allowing me to participate in the care of this patient.  If you have any questions, please do not hesitate to contact me. [FreeTextEntry3] : Aliyah Lewis MD\par  [Sincerely,] : Sincerely,

## 2019-04-18 NOTE — REASON FOR VISIT
[Blood Count Assessment] : blood count assessment [Initial Consultation] : an initial consultation for [FreeTextEntry2] : r/o amyloidosis

## 2019-04-21 LAB — ANA SER IF-ACNC: NEGATIVE

## 2019-04-22 ENCOUNTER — FORM ENCOUNTER (OUTPATIENT)
Age: 49
End: 2019-04-22

## 2019-04-23 ENCOUNTER — OUTPATIENT (OUTPATIENT)
Dept: OUTPATIENT SERVICES | Facility: HOSPITAL | Age: 49
LOS: 1 days | End: 2019-04-23
Payer: COMMERCIAL

## 2019-04-23 ENCOUNTER — APPOINTMENT (OUTPATIENT)
Dept: MRI IMAGING | Facility: CLINIC | Age: 49
End: 2019-04-23
Payer: COMMERCIAL

## 2019-04-23 DIAGNOSIS — Q04.8 OTHER SPECIFIED CONGENITAL MALFORMATIONS OF BRAIN: Chronic | ICD-10-CM

## 2019-04-23 DIAGNOSIS — D12.6 BENIGN NEOPLASM OF COLON, UNSPECIFIED: Chronic | ICD-10-CM

## 2019-04-23 LAB
ALBUMIN MFR SERPL ELPH: 57.3 %
ALBUMIN SERPL-MCNC: 3.8 G/DL
ALBUMIN/GLOB SERPL: 1.4 RATIO
ALPHA1 GLOB MFR SERPL ELPH: 6.1 %
ALPHA1 GLOB SERPL ELPH-MCNC: 0.4 G/DL
ALPHA2 GLOB MFR SERPL ELPH: 10.2 %
ALPHA2 GLOB SERPL ELPH-MCNC: 0.7 G/DL
B-GLOBULIN MFR SERPL ELPH: 15.8 %
B-GLOBULIN SERPL ELPH-MCNC: 1 G/DL
DEPRECATED KAPPA LC FREE/LAMBDA SER: 0.02 RATIO
GAMMA GLOB FLD ELPH-MCNC: 0.7 G/DL
GAMMA GLOB MFR SERPL ELPH: 10.6 %
IGA SER QL IEP: 371 MG/DL
IGG SER QL IEP: 693 MG/DL
IGM SER QL IEP: 13 MG/DL
INTERPRETATION SERPL IEP-IMP: NORMAL
KAPPA LC CSF-MCNC: 73.04 MG/DL
KAPPA LC SERPL-MCNC: 1.11 MG/DL
M PROTEIN MFR SERPL ELPH: NORMAL
M PROTEIN SPEC IFE-MCNC: NORMAL
MONOCLON BAND OBS SERPL: NORMAL
PROT SERPL-MCNC: 6.6 G/DL
PROT SERPL-MCNC: 6.6 G/DL

## 2019-04-23 PROCEDURE — 75561 CARDIAC MRI FOR MORPH W/DYE: CPT

## 2019-04-23 PROCEDURE — 75565 CARD MRI VELOC FLOW MAPPING: CPT | Mod: 26

## 2019-04-23 PROCEDURE — 75561 CARDIAC MRI FOR MORPH W/DYE: CPT | Mod: 26

## 2019-04-23 PROCEDURE — 75565 CARD MRI VELOC FLOW MAPPING: CPT

## 2019-04-25 ENCOUNTER — LABORATORY RESULT (OUTPATIENT)
Age: 49
End: 2019-04-25

## 2019-04-25 ENCOUNTER — APPOINTMENT (OUTPATIENT)
Dept: HEMATOLOGY ONCOLOGY | Facility: CLINIC | Age: 49
End: 2019-04-25
Payer: COMMERCIAL

## 2019-04-25 VITALS
WEIGHT: 158 LBS | RESPIRATION RATE: 16 BRPM | BODY MASS INDEX: 31.85 KG/M2 | TEMPERATURE: 97.5 F | DIASTOLIC BLOOD PRESSURE: 87 MMHG | HEIGHT: 59 IN | SYSTOLIC BLOOD PRESSURE: 122 MMHG | OXYGEN SATURATION: 93 % | HEART RATE: 109 BPM

## 2019-04-25 PROCEDURE — 38221 DX BONE MARROW BIOPSIES: CPT

## 2019-04-25 PROCEDURE — 38220 DX BONE MARROW ASPIRATIONS: CPT | Mod: 59

## 2019-04-25 PROCEDURE — 99214 OFFICE O/P EST MOD 30 MIN: CPT | Mod: 25

## 2019-04-25 PROCEDURE — 36415 COLL VENOUS BLD VENIPUNCTURE: CPT

## 2019-04-25 NOTE — PROCEDURE
[Bone Marrow Biopsy] : bone marrow biopsy [Bone Marrow Aspiration] : bone marrow aspiration  [Patient] : the patient [Patient identification verified] : patient identification verified [Right] : site: right [Correct positioning] : correct positioning [Left lateral decibitus position] : left lateral decibitus position [Prone] : prone [Superior iliac spine was identified] : the superior iliac spine was identified. [The right posterior iliac crest was prepped with betadine and draped, using sterile technique.] : The right posterior iliac crest was prepped with betadine and draped, using sterile technique. [Lidocaine was injected and into the periosteum overlying the site.] : Lidocaine was injected and into the periosteum overlying the site. [Aspirate] : aspirate [Cytogenetics] : cytogenetics [FISH] : FISH [PCR] : PCR [Biopsy] : biopsy [Flow Cytometry] : flow cytometry [] : The patient was instructed to remove the bandage the following AM. The patient may bathe. Acetaminophen may be taken for discomfort, as per package directions.If there are any other problems, the patient was instructed to call the office. The patient verbalized understanding, and is aware of the office contact numbers.

## 2019-04-26 ENCOUNTER — LABORATORY RESULT (OUTPATIENT)
Age: 49
End: 2019-04-26

## 2019-04-29 ENCOUNTER — APPOINTMENT (OUTPATIENT)
Dept: MRI IMAGING | Facility: CLINIC | Age: 49
End: 2019-04-29

## 2019-04-29 ENCOUNTER — APPOINTMENT (OUTPATIENT)
Dept: HEART AND VASCULAR | Facility: CLINIC | Age: 49
End: 2019-04-29
Payer: COMMERCIAL

## 2019-04-29 VITALS
TEMPERATURE: 97.2 F | OXYGEN SATURATION: 93 % | SYSTOLIC BLOOD PRESSURE: 121 MMHG | HEART RATE: 115 BPM | HEIGHT: 59 IN | DIASTOLIC BLOOD PRESSURE: 86 MMHG | WEIGHT: 159 LBS | BODY MASS INDEX: 32.05 KG/M2

## 2019-04-29 PROCEDURE — 99214 OFFICE O/P EST MOD 30 MIN: CPT

## 2019-04-29 PROCEDURE — 93000 ELECTROCARDIOGRAM COMPLETE: CPT

## 2019-04-29 RX ORDER — TRIAMTERENE AND HYDROCHLOROTHIAZIDE 37.5; 25 MG/1; MG/1
37.5-25 CAPSULE ORAL
Qty: 30 | Refills: 0 | Status: DISCONTINUED | COMMUNITY
Start: 2018-11-28 | End: 2019-04-29

## 2019-04-29 RX ORDER — TRIAMTERENE AND HYDROCHLOROTHIAZIDE 25; 37.5 MG/1; MG/1
37.5-25 TABLET ORAL DAILY
Qty: 1 | Refills: 0 | Status: DISCONTINUED | COMMUNITY
End: 2019-04-29

## 2019-04-29 NOTE — PHYSICAL EXAM
[General Appearance - Well Developed] : well developed [Normal Appearance] : normal appearance [Well Groomed] : well groomed [General Appearance - Well Nourished] : well nourished [No Deformities] : no deformities [General Appearance - In No Acute Distress] : no acute distress [Normal Conjunctiva] : the conjunctiva exhibited no abnormalities [Eyelids - No Xanthelasma] : the eyelids demonstrated no xanthelasmas [Normal Oral Mucosa] : normal oral mucosa [No Oral Pallor] : no oral pallor [No Oral Cyanosis] : no oral cyanosis [Normal Jugular Venous A Waves Present] : normal jugular venous A waves present [Normal Jugular Venous V Waves Present] : normal jugular venous V waves present [No Jugular Venous Forbes A Waves] : no jugular venous forbes A waves [5th Left ICS - MCL] : palpated at the 5th LICS in the midclavicular line [Normal] : normal [Rhythm Regular] : regular [Normal S1] : normal S1 [Normal S2] : normal S2 [___ +] : bilateral [unfilled]U+ pretibial pitting edema [Respiration, Rhythm And Depth] : normal respiratory rhythm and effort [Exaggerated Use Of Accessory Muscles For Inspiration] : no accessory muscle use [Auscultation Breath Sounds / Voice Sounds] : lungs were clear to auscultation bilaterally [Abdomen Soft] : soft [Abdomen Tenderness] : non-tender [Abdomen Mass (___ Cm)] : no abdominal mass palpated [Abnormal Walk] : normal gait [Gait - Sufficient For Exercise Testing] : the gait was sufficient for exercise testing [Nail Clubbing] : no clubbing of the fingernails [Cyanosis, Localized] : no localized cyanosis [Petechial Hemorrhages (___cm)] : no petechial hemorrhages [Skin Color & Pigmentation] : normal skin color and pigmentation [] : no rash [No Venous Stasis] : no venous stasis [Skin Lesions] : no skin lesions [No Skin Ulcers] : no skin ulcer [No Xanthoma] : no  xanthoma was observed [Oriented To Time, Place, And Person] : oriented to person, place, and time [Affect] : the affect was normal [Mood] : the mood was normal [No Anxiety] : not feeling anxious

## 2019-04-29 NOTE — REASON FOR VISIT
[Initial Evaluation] : an initial evaluation of [Family Member] : family member [Heart Failure] : congestive heart failure [FreeTextEntry1] : amyloid

## 2019-04-29 NOTE — HISTORY OF PRESENT ILLNESS
[de-identified] : 49 years old female with no significant past medical history he developed a heart attack in September of 2018 and in February a stroke... workup so far show onset date thickening of heart ventricles rule out amyloidosis [FreeTextEntry1] : Followed by [facility]. Last seen 4/10/19\par \par 47 yo obese female with a PMhx HTN, Sickle Cell Trait, \par Has had chest pain\par \par EKG revealed Sinus Rhythm with nonspecific TW changes in V6, I, AVL \par (CONFRIM). Pertinent lab values revealed include WBC: 12.3K, D Dimer negative, \par K: 3.1, TSH WNL, BNP 1,233 and Troponin 0.819. ACS Protocol was initiated and \par patient was loaded with  mg, Plavix 600 mg, Heparin gtt with bolus \par started and patient received Lipitor 80 mg daily, Lopressor 25 mg PO x one \par . Peak Troponin T 0.26 . Patient underwent diagnostic cardiac cath 9/12 \par revealing distal apical LAD 50-60% (small vessel), LM RCA and LCx normal, LVEF \par 55%, LVEDP 14 mm Hg. \par \par \par 3/28/19 MRI results reviewed, will need to r/o cardiac etiology \par 4/12/19 has eventm onitor on to eval for afib. ekg nsr low voltage\par edu with biventricular thickening and biatrial enlargement suggestive of restrictive cm - posibe amyloid\par \par CAD\par  Patient underwent diagnostic cardiac cath 9/12 \par revealing distal apical LAD 50-60% (small vessel), LM RCA and LCx normal, LVEF \par 55%, LVEDP 14 mm Hg. \par 2d echo noraml ef pressures valves\par \par CM\par -restrictive, will get [facility] heme/onc c/s and \par adv HF consult may need endomycardial biopsy for congo staining\par \par \par Heme (Aliyah Lewis): Repeat blood work pt has an elevated Hb and increased Lambda light chain which can be suggestive of AL amyloidosis...\par Immunoglobin [id number], Kappa 0.02\par \par Planning to do BM aspirate and Bx to confirm.\par Followed by [facility]. Last seen 4/10/19\par \par 47 yo obese female with a PMhx HTN, Sickle Cell Trait, \par Has had chest pain\par \par EKG revealed Sinus Rhythm with nonspecific TW changes in V6, I, AVL \par (CONFRIM). Pertinent lab values revealed include WBC: 12.3K, D Dimer negative, \par K: 3.1, TSH WNL, BNP 1,233 and Troponin 0.819. ACS Protocol was initiated and \par patient was loaded with  mg, Plavix 600 mg, Heparin gtt with bolus \par started and patient received Lipitor 80 mg daily, Lopressor 25 mg PO x one \par . Peak Troponin T 0.26 . Patient underwent diagnostic cardiac cath 9/12 \par revealing distal apical LAD 50-60% (small vessel), LM RCA and LCx normal, LVEF \par 55%, LVEDP 14 mm Hg. \par \par \par 3/28/19 MRI results reviewed, will need to r/o cardiac etiology \par 4/12/19 has eventm onitor on to eval for afib. ekg nsr low voltage\par edu with biventricular thickening and biatrial enlargement suggestive of restrictive cm - posibe amyloid\par \par CAD\par  Patient underwent diagnostic cardiac cath 9/12 \par revealing distal apical LAD 50-60% (small vessel), LM RCA and LCx normal, LVEF \par 55%, LVEDP 14 mm Hg. \par 2d echo noraml ef pressures valves\par \par CM\par -restrictive, will get [facility] heme/onc c/s and \par adv HF consult may need endomycardial biopsy for congo staining\par \par \par Heme (Aliyah Lewis): Repeat blood work pt has an elevated Hb and increased Lambda light chain which can be suggestive of AL amyloidosis...\par Immunoglobin [id number], Kappa 0.02\par \par Planning to do BM aspirate and Bx to confirm.\par \par Followed by [facility]. Last seen 4/10/19\par \par 47 yo obese female with a PMhx HTN, Sickle Cell Trait, \par Has had chest pain\par \par EKG revealed Sinus Rhythm with nonspecific TW changes in V6, I, AVL \par (CONFRIM). Pertinent lab values revealed include WBC: 12.3K, D Dimer negative, \par K: 3.1, TSH WNL, BNP 1,233 and Troponin 0.819. ACS Protocol was initiated and \par patient was loaded with  mg, Plavix 600 mg, Heparin gtt with bolus \par started and patient received Lipitor 80 mg daily, Lopressor 25 mg PO x one \par . Peak Troponin T 0.26 . Patient underwent diagnostic cardiac cath 9/12 \par revealing distal apical LAD 50-60% (small vessel), LM RCA and LCx normal, LVEF \par 55%, LVEDP 14 mm Hg. \par \par \par 3/28/19 MRI results reviewed, will need to r/o cardiac etiology \par 4/12/19 has eventm onitor on to eval for afib. ekg nsr low voltage\par edu with biventricular thickening and biatrial enlargement suggestive of restrictive cm - posibe amyloid\par \par CAD\par  Patient underwent diagnostic cardiac cath 9/12 \par revealing distal apical LAD 50-60% (small vessel), LM RCA and LCx normal, LVEF \par 55%, LVEDP 14 mm Hg. 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

## 2019-04-29 NOTE — HISTORY OF PRESENT ILLNESS
[FreeTextEntry1] : Followed by Jacinto. Last seen 4/10/19\par \par 48 yo female with a PMhx HTN, Sickle Cell Trait, \par Has had chest pain in past: \par \par  Peak Troponin T 0.26 . Patient underwent diagnostic cardiac cath 9/12 \par revealing distal apical LAD 50-60% (small vessel), LM RCA and LCx normal, LVEF \par 55%, LVEDP 14 mm Hg. 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

## 2019-04-29 NOTE — HISTORY OF PRESENT ILLNESS
[de-identified] : 49 years old female with no significant past medical history he developed a heart attack in September of 2018 and in February a stroke... workup so far show onset date thickening of heart ventricles rule out amyloidosis [FreeTextEntry1] : Followed by [facility]. Last seen 4/10/19\par \par 47 yo obese female with a PMhx HTN, Sickle Cell Trait, \par Has had chest pain\par \par EKG revealed Sinus Rhythm with nonspecific TW changes in V6, I, AVL \par (CONFRIM). Pertinent lab values revealed include WBC: 12.3K, D Dimer negative, \par K: 3.1, TSH WNL, BNP 1,233 and Troponin 0.819. ACS Protocol was initiated and \par patient was loaded with  mg, Plavix 600 mg, Heparin gtt with bolus \par started and patient received Lipitor 80 mg daily, Lopressor 25 mg PO x one \par . Peak Troponin T 0.26 . Patient underwent diagnostic cardiac cath 9/12 \par revealing distal apical LAD 50-60% (small vessel), LM RCA and LCx normal, LVEF \par 55%, LVEDP 14 mm Hg. \par \par \par 3/28/19 MRI results reviewed, will need to r/o cardiac etiology \par 4/12/19 has eventm onitor on to eval for afib. ekg nsr low voltage\par edu with biventricular thickening and biatrial enlargement suggestive of restrictive cm - posibe amyloid\par \par CAD\par  Patient underwent diagnostic cardiac cath 9/12 \par revealing distal apical LAD 50-60% (small vessel), LM RCA and LCx normal, LVEF \par 55%, LVEDP 14 mm Hg. \par 2d echo noraml ef pressures valves\par \par CM\par -restrictive, will get [facility] heme/onc c/s and \par adv HF consult may need endomycardial biopsy for congo staining\par \par \par Heme (Aliyah Lewis): Repeat blood work pt has an elevated Hb and increased Lambda light chain which can be suggestive of AL amyloidosis...\par Immunoglobin [id number], Kappa 0.02\par \par Planning to do BM aspirate and Bx to confirm.\par Followed by [facility]. Last seen 4/10/19\par \par 47 yo obese female with a PMhx HTN, Sickle Cell Trait, \par Has had chest pain\par \par EKG revealed Sinus Rhythm with nonspecific TW changes in V6, I, AVL \par (CONFRIM). Pertinent lab values revealed include WBC: 12.3K, D Dimer negative, \par K: 3.1, TSH WNL, BNP 1,233 and Troponin 0.819. ACS Protocol was initiated and \par patient was loaded with  mg, Plavix 600 mg, Heparin gtt with bolus \par started and patient received Lipitor 80 mg daily, Lopressor 25 mg PO x one \par . Peak Troponin T 0.26 . Patient underwent diagnostic cardiac cath 9/12 \par revealing distal apical LAD 50-60% (small vessel), LM RCA and LCx normal, LVEF \par 55%, LVEDP 14 mm Hg. \par \par \par 3/28/19 MRI results reviewed, will need to r/o cardiac etiology \par 4/12/19 has eventm onitor on to eval for afib. ekg nsr low voltage\par edu with biventricular thickening and biatrial enlargement suggestive of restrictive cm - posibe amyloid\par \par CAD\par  Patient underwent diagnostic cardiac cath 9/12 \par revealing distal apical LAD 50-60% (small vessel), LM RCA and LCx normal, LVEF \par 55%, LVEDP 14 mm Hg. \par 2d echo noraml ef pressures valves\par \par CM\par -restrictive, will get [facility] heme/onc c/s and \par adv HF consult may need endomycardial biopsy for congo staining\par \par \par Heme (Aliyah Lewis): Repeat blood work pt has an elevated Hb and increased Lambda light chain which can be suggestive of AL amyloidosis...\par Immunoglobin [id number], Kappa 0.02\par \par Planning to do BM aspirate and Bx to confirm.\par \par Followed by [facility]. Last seen 4/10/19\par \par 47 yo obese female with a PMhx HTN, Sickle Cell Trait, \par Has had chest pain\par \par EKG revealed Sinus Rhythm with nonspecific TW changes in V6, I, AVL \par (CONFRIM). Pertinent lab values revealed include WBC: 12.3K, D Dimer negative, \par K: 3.1, TSH WNL, BNP 1,233 and Troponin 0.819. ACS Protocol was initiated and \par patient was loaded with  mg, Plavix 600 mg, Heparin gtt with bolus \par started and patient received Lipitor 80 mg daily, Lopressor 25 mg PO x one \par . Peak Troponin T 0.26 . Patient underwent diagnostic cardiac cath 9/12 \par revealing distal apical LAD 50-60% (small vessel), LM RCA and LCx normal, LVEF \par 55%, LVEDP 14 mm Hg. \par \par \par 3/28/19 MRI results reviewed, will need to r/o cardiac etiology \par 4/12/19 has eventm onitor on to eval for afib. ekg nsr low voltage\par edu with biventricular thickening and biatrial enlargement suggestive of restrictive cm - posibe amyloid\par \par CAD\par  Patient underwent diagnostic cardiac cath 9/12 \par revealing distal apical LAD 50-60% (small vessel), LM RCA and LCx normal, LVEF \par 55%, LVEDP 14 mm Hg. 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

## 2019-04-29 NOTE — ASSESSMENT
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 Lewis) for chemo for her plasma cell disorder Al amyloid\par

## 2019-04-30 LAB
ALBUMIN SERPL ELPH-MCNC: 4.1 G/DL
ALP BLD-CCNC: 83 U/L
ALT SERPL-CCNC: 73 U/L
ANION GAP SERPL CALC-SCNC: 16 MMOL/L
AST SERPL-CCNC: 49 U/L
BASOPHILS # BLD AUTO: 0.06 K/UL
BASOPHILS NFR BLD AUTO: 0.7 %
BILIRUB SERPL-MCNC: 1.3 MG/DL
BUN SERPL-MCNC: 22 MG/DL
CALCIUM SERPL-MCNC: 10.1 MG/DL
CHLORIDE SERPL-SCNC: 103 MMOL/L
CO2 SERPL-SCNC: 24 MMOL/L
CREAT SERPL-MCNC: 1.05 MG/DL
EOSINOPHIL # BLD AUTO: 0.04 K/UL
EOSINOPHIL NFR BLD AUTO: 0.4 %
EPO SERPL-MCNC: 65.2 MIU/ML
ERYTHROCYTE [SEDIMENTATION RATE] IN BLOOD BY WESTERGREN METHOD: 4 MM/HR
GLUCOSE SERPL-MCNC: 119 MG/DL
HCT VFR BLD CALC: 48.8 %
HGB BLD-MCNC: 16.7 G/DL
IMM GRANULOCYTES NFR BLD AUTO: 0.3 %
LDH SERPL-CCNC: 347 U/L
LYMPHOCYTES # BLD AUTO: 1.95 K/UL
LYMPHOCYTES NFR BLD AUTO: 21.7 %
MAN DIFF?: NORMAL
MCHC RBC-ENTMCNC: 27.2 PG
MCHC RBC-ENTMCNC: 34.2 GM/DL
MCV RBC AUTO: 79.6 FL
MISCELLANEOUS TEST: NORMAL
MONOCYTES # BLD AUTO: 0.71 K/UL
MONOCYTES NFR BLD AUTO: 7.9 %
NEUTROPHILS # BLD AUTO: 6.2 K/UL
NEUTROPHILS NFR BLD AUTO: 69 %
PLATELET # BLD AUTO: 325 K/UL
POTASSIUM SERPL-SCNC: 4.6 MMOL/L
PROC NAME: NORMAL
PROT SERPL-MCNC: 6.4 G/DL
RBC # BLD: 6.13 M/UL
RBC # FLD: 16.2 %
SODIUM SERPL-SCNC: 143 MMOL/L
TESTOST SERPL-MCNC: 40.3 NG/DL
WBC # FLD AUTO: 8.99 K/UL

## 2019-05-02 ENCOUNTER — APPOINTMENT (OUTPATIENT)
Dept: SURGERY | Facility: CLINIC | Age: 49
End: 2019-05-02
Payer: COMMERCIAL

## 2019-05-02 ENCOUNTER — OUTPATIENT (OUTPATIENT)
Dept: OUTPATIENT SERVICES | Facility: HOSPITAL | Age: 49
LOS: 1 days | End: 2019-05-02
Payer: COMMERCIAL

## 2019-05-02 VITALS
HEIGHT: 59 IN | BODY MASS INDEX: 31.9 KG/M2 | WEIGHT: 158.25 LBS | HEART RATE: 98 BPM | TEMPERATURE: 97.8 F | SYSTOLIC BLOOD PRESSURE: 100 MMHG | OXYGEN SATURATION: 96 % | DIASTOLIC BLOOD PRESSURE: 68 MMHG

## 2019-05-02 DIAGNOSIS — Z82.3 FAMILY HISTORY OF STROKE: ICD-10-CM

## 2019-05-02 DIAGNOSIS — Z83.3 FAMILY HISTORY OF DIABETES MELLITUS: ICD-10-CM

## 2019-05-02 DIAGNOSIS — I21.4 NON-ST ELEVATION (NSTEMI) MYOCARDIAL INFARCTION: ICD-10-CM

## 2019-05-02 DIAGNOSIS — Z82.49 FAMILY HISTORY OF ISCHEMIC HEART DISEASE AND OTHER DISEASES OF THE CIRCULATORY SYSTEM: ICD-10-CM

## 2019-05-02 DIAGNOSIS — Z86.79 PERSONAL HISTORY OF OTHER DISEASES OF THE CIRCULATORY SYSTEM: ICD-10-CM

## 2019-05-02 DIAGNOSIS — D75.1 SECONDARY POLYCYTHEMIA: ICD-10-CM

## 2019-05-02 DIAGNOSIS — D12.6 BENIGN NEOPLASM OF COLON, UNSPECIFIED: Chronic | ICD-10-CM

## 2019-05-02 DIAGNOSIS — I63.9 CEREBRAL INFARCTION, UNSPECIFIED: ICD-10-CM

## 2019-05-02 DIAGNOSIS — Q04.8 OTHER SPECIFIED CONGENITAL MALFORMATIONS OF BRAIN: Chronic | ICD-10-CM

## 2019-05-02 DIAGNOSIS — Z01.818 ENCOUNTER FOR OTHER PREPROCEDURAL EXAMINATION: ICD-10-CM

## 2019-05-02 LAB
APPEARANCE UR: CLEAR — SIGNIFICANT CHANGE UP
APTT BLD: 26.5 SEC — LOW (ref 27.5–36.3)
BACTERIA # UR AUTO: ABNORMAL /HPF
BASOPHILS # BLD AUTO: 0.07 K/UL — SIGNIFICANT CHANGE UP (ref 0–0.2)
BASOPHILS NFR BLD AUTO: 0.7 % — SIGNIFICANT CHANGE UP (ref 0–2)
BILIRUB UR-MCNC: NEGATIVE — SIGNIFICANT CHANGE UP
COLOR SPEC: YELLOW — SIGNIFICANT CHANGE UP
DIFF PNL FLD: ABNORMAL
EOSINOPHIL # BLD AUTO: 0.11 K/UL — SIGNIFICANT CHANGE UP (ref 0–0.5)
EOSINOPHIL NFR BLD AUTO: 1.1 % — SIGNIFICANT CHANGE UP (ref 0–6)
GLUCOSE UR QL: NEGATIVE — SIGNIFICANT CHANGE UP
HCT VFR BLD CALC: 49.1 % — HIGH (ref 34.5–45)
HGB BLD-MCNC: 16.5 G/DL — HIGH (ref 11.5–15.5)
IMM GRANULOCYTES NFR BLD AUTO: 0.2 % — SIGNIFICANT CHANGE UP (ref 0–1.5)
INR BLD: 1.33 — HIGH (ref 0.88–1.16)
KETONES UR-MCNC: NEGATIVE — SIGNIFICANT CHANGE UP
LEUKOCYTE ESTERASE UR-ACNC: NEGATIVE — SIGNIFICANT CHANGE UP
LYMPHOCYTES # BLD AUTO: 2.69 K/UL — SIGNIFICANT CHANGE UP (ref 1–3.3)
LYMPHOCYTES # BLD AUTO: 26.1 % — SIGNIFICANT CHANGE UP (ref 13–44)
MCHC RBC-ENTMCNC: 27.8 PG — SIGNIFICANT CHANGE UP (ref 27–34)
MCHC RBC-ENTMCNC: 33.6 GM/DL — SIGNIFICANT CHANGE UP (ref 32–36)
MCV RBC AUTO: 82.7 FL — SIGNIFICANT CHANGE UP (ref 80–100)
MONOCYTES # BLD AUTO: 1.1 K/UL — HIGH (ref 0–0.9)
MONOCYTES NFR BLD AUTO: 10.7 % — SIGNIFICANT CHANGE UP (ref 2–14)
NEUTROPHILS # BLD AUTO: 6.31 K/UL — SIGNIFICANT CHANGE UP (ref 1.8–7.4)
NEUTROPHILS NFR BLD AUTO: 61.2 % — SIGNIFICANT CHANGE UP (ref 43–77)
NITRITE UR-MCNC: NEGATIVE — SIGNIFICANT CHANGE UP
NRBC # BLD: 0 /100 WBCS — SIGNIFICANT CHANGE UP (ref 0–0)
PH UR: 6 — SIGNIFICANT CHANGE UP (ref 5–8)
PLATELET # BLD AUTO: 293 K/UL — SIGNIFICANT CHANGE UP (ref 150–400)
PROT UR-MCNC: 30 MG/DL
PROTHROM AB SERPL-ACNC: 15.2 SEC — HIGH (ref 10–12.9)
RBC # BLD: 5.94 M/UL — HIGH (ref 3.8–5.2)
RBC # FLD: 15.6 % — HIGH (ref 10.3–14.5)
RBC CASTS # UR COMP ASSIST: < 5 /HPF — SIGNIFICANT CHANGE UP
SP GR SPEC: 1.02 — SIGNIFICANT CHANGE UP (ref 1–1.03)
UROBILINOGEN FLD QL: 0.2 E.U./DL — SIGNIFICANT CHANGE UP
WBC # BLD: 10.3 K/UL — SIGNIFICANT CHANGE UP (ref 3.8–10.5)
WBC # FLD AUTO: 10.3 K/UL — SIGNIFICANT CHANGE UP (ref 3.8–10.5)
WBC UR QL: ABNORMAL /HPF

## 2019-05-02 PROCEDURE — 85025 COMPLETE CBC W/AUTO DIFF WBC: CPT

## 2019-05-02 PROCEDURE — 85610 PROTHROMBIN TIME: CPT

## 2019-05-02 PROCEDURE — 99204 OFFICE O/P NEW MOD 45 MIN: CPT

## 2019-05-02 PROCEDURE — 87086 URINE CULTURE/COLONY COUNT: CPT

## 2019-05-02 PROCEDURE — 99214 OFFICE O/P EST MOD 30 MIN: CPT

## 2019-05-02 PROCEDURE — 81001 URINALYSIS AUTO W/SCOPE: CPT

## 2019-05-02 PROCEDURE — 85730 THROMBOPLASTIN TIME PARTIAL: CPT

## 2019-05-02 PROCEDURE — 87186 SC STD MICRODIL/AGAR DIL: CPT

## 2019-05-04 PROBLEM — I21.4 NSTEMI, INITIAL EPISODE OF CARE: Status: ACTIVE | Noted: 2018-10-11

## 2019-05-04 PROBLEM — Z82.3 FAMILY HISTORY OF CEREBROVASCULAR ACCIDENT (CVA): Status: ACTIVE | Noted: 2018-10-29

## 2019-05-04 PROBLEM — D75.1 POLYCYTHEMIA: Status: ACTIVE | Noted: 2019-04-17

## 2019-05-04 PROBLEM — Z83.3 FAMILY HISTORY OF DIABETES MELLITUS: Status: ACTIVE | Noted: 2018-10-29

## 2019-05-04 PROBLEM — Z82.49 FAMILY HISTORY OF HYPERTENSION: Status: ACTIVE | Noted: 2018-10-29

## 2019-05-04 PROBLEM — Z86.79 HISTORY OF HYPERTENSION: Status: RESOLVED | Noted: 2019-05-04 | Resolved: 2019-05-04

## 2019-05-04 PROBLEM — I63.9 CVA (CEREBRAL VASCULAR ACCIDENT): Status: ACTIVE | Noted: 2019-02-20

## 2019-05-04 LAB
-  AMPICILLIN/SULBACTAM: SIGNIFICANT CHANGE UP
-  AMPICILLIN: SIGNIFICANT CHANGE UP
-  CEFAZOLIN: SIGNIFICANT CHANGE UP
-  CEFTRIAXONE: SIGNIFICANT CHANGE UP
-  GENTAMICIN: SIGNIFICANT CHANGE UP
-  NITROFURANTOIN: SIGNIFICANT CHANGE UP
-  PIPERACILLIN/TAZOBACTAM: SIGNIFICANT CHANGE UP
-  TOBRAMYCIN: SIGNIFICANT CHANGE UP
-  TRIMETHOPRIM/SULFAMETHOXAZOLE: SIGNIFICANT CHANGE UP
CULTURE RESULTS: SIGNIFICANT CHANGE UP
METHOD TYPE: SIGNIFICANT CHANGE UP
ORGANISM # SPEC MICROSCOPIC CNT: SIGNIFICANT CHANGE UP
ORGANISM # SPEC MICROSCOPIC CNT: SIGNIFICANT CHANGE UP
SPECIMEN SOURCE: SIGNIFICANT CHANGE UP

## 2019-05-04 NOTE — ASSESSMENT
[FreeTextEntry1] : Ms. Britton is a 49-year-old woman with likely amyloidosis.  We will plan for an abdominal fat pad biopsy on May 10, 2019.

## 2019-05-04 NOTE — REVIEW OF SYSTEMS
[Earache] : no earache [Loss Of Hearing] : no hearing loss [Nosebleeds] : no nosebleeds [Nasal Discharge] : no nasal discharge [Sore Throat] : no sore throat [Hoarseness] : hoarseness [Heart Rate Is Slow] : slow heart rate [Heart Rate Is Fast] : fast heart rate [Chest Pain] : no chest pain [Palpitations] : no palpitations [Leg Claudication] : no intermittent leg claudication [Lower Ext Edema] : lower extremity edema [Wheezing] : no wheezing [Shortness Of Breath] : shortness of breath [Cough] : no cough [SOB on Exertion] : shortness of breath during exertion [Orthopnea] : orthopnea [PND] : PND [Confused] : no confusion [Convulsions] : no convulsions [Dizziness] : dizziness [Fainting] : no fainting [Limb Weakness] : limb weakness [Difficulty Walking] : difficulty walking [Negative] : Heme/Lymph

## 2019-05-04 NOTE — HISTORY OF PRESENT ILLNESS
[de-identified] : Ms. Britton presented today for discussion of a possible abdominal fat pat biopsy for diagnosis of amyloidosis.  She stated she has never had any abdominal surgeries.  She denied abdominal pain.

## 2019-05-04 NOTE — PHYSICAL EXAM
[Calm] : calm [de-identified] : NAD, comfortable [de-identified] : Supple, no JVD or cervical lymphadenopathy [de-identified] : NCAT, no scleral icterus [de-identified] : CTAB [de-identified] : S1S2 normal, RRR [de-identified] : +BS soft NT ND.  No hepatosplenomegaly. [de-identified] : No clubbing or cyanosis.  Mild LE edema. [de-identified] : Warm, dry. [de-identified] : A&Ox3

## 2019-05-04 NOTE — CONSULT LETTER
[FreeTextEntry1] : May 2, 2019\par \par \par Robert Barnes M.D.\par [address]\[place] [address]\par Telephone #: [phone]\par \par \par Re: Oscar Britton\par : 1970\par \par \par Dear Dr. Barnes:\par \par I had the opportunity to see Ms. Britton today for discussion of a possible abdominal fat pat biopsy for diagnosis of amyloidosis.  She stated she has never had any abdominal surgeries.  She denied abdominal pain.\par \par As you are aware, she has a past medical history of CHF, NSTEMI (2018), hypertension, and a stroke (2019). She has a past surgical history of a coronary angiogram (no stent) and a dilation and evacuation.\par \par Her medications include furosemide, amlodipine, metoprolol, clopidogrel, aspirin 81 mg, ferrous sulfate, vitamin B12, and a multivitamin. She has no known allergies.\par \par A ten-point review of systems was positive for hoarseness, slow heart rate, fast heart rate, leg pain with walking (ankles), lower extremity edema, shortness of breath, shortness of breath on exertion, orthopnea, paroxysmal nocturnal dyspnea, dizziness, limb weakness, and difficulty walking.  Her last menstrual period was 3/3/2019.  She had a Pap smear and mammogram in 2018.\par \par She has a family history of diabetes in her mother, hypertension in her mother and father, renal failure in her mother, and a stroke in her father.\par \par Her social history is significant for former tobacco use.  She denied current tobacco use, alcohol use, caffeine use, or drug use. \par \par On physical examination, her height is 4 feet 11 inches, her weight is 158 pounds, and BMI 31.96. Her temperature is 97.8 °F, blood pressure is 100/68, heart rate 98, and O2 saturation is 96% on room air.  In general, she is a well-dressed, well-nourished woman who appears her stated age and is in no acute distress.  She is calm, alert and oriented x3.  HEENT exam demonstrates no scleral icterus and a normocephalic atraumatic appearance.  Her neck is supple without JVD or cervical lymphadenopathy.  Her lungs are clear to auscultation bilaterally.  Heart sounds S1 S2 are normal with a regular rate and rhythm.  Her abdomen has audible bowel sounds, is soft, non-tender, and non-distended.  There is no hepatosplenomegaly.  Her extremities are warm and dry without clubbing, cyanosis or edema.  \par \par In summary, Ms. Britton is a 49-year-old woman with likely amyloidosis.  We will plan for an abdominal fat pad biopsy on May 10, 2019.\par \par Thank you for the opportunity to care for this patient. Please do not hesitate to contact me in the event that you have any questions or concerns about the care of this patient.\par \par Sincerely,\par \par \par \par Matilda Richardson M.D.\[facility] \par CC:\par Aliyah Lewis M.D.\[facility] [address]\[place], [place]  [address] Telephone #: [phone]\[facility] \par Jenny Casey M.D.\[facility] [address] [place], [place]  [address] Telephone #: [phone]\[facility] \par Trupti Montana M.D.\[facility] [address], [address] [facility]\[place], [place]  [address] Telephone #:  [phone]

## 2019-05-06 ENCOUNTER — RX CHANGE (OUTPATIENT)
Age: 49
End: 2019-05-06

## 2019-05-06 RX ORDER — TORSEMIDE 20 MG/1
20 TABLET ORAL DAILY
Qty: 180 | Refills: 3 | Status: ACTIVE | COMMUNITY
Start: 2019-05-06 | End: 1900-01-01

## 2019-05-06 RX ORDER — FUROSEMIDE 40 MG/1
40 TABLET ORAL DAILY
Qty: 90 | Refills: 3 | Status: DISCONTINUED | COMMUNITY
Start: 2019-04-29 | End: 2019-05-06

## 2019-05-07 NOTE — ASSESSMENT
[FreeTextEntry1] : Repeat blood work pt has increased Lambda light chain which can be suggestive of AL amyloidosis...\par \par BM aspirate and Bx done, pt tolerated procedure very well...results...consistent with MM, amyloidosis?\par \par Awaiting fat pad Bx this week.\par \par Pt hematologically cleared for fat pad Bx.\par \par \par

## 2019-05-07 NOTE — HISTORY OF PRESENT ILLNESS
[de-identified] : 49 years old female with no significant past medical history he developed a heart attack in September of 2018 and in February a stroke... workup so far show onset date thickening of heart ventricles rule out amyloidosis. Pt had a lambda light chain paraprotein and is here to have BM aspirate and bx r/o AL amyloidosis.

## 2019-05-07 NOTE — CONSULT LETTER
[Dear  ___] : Dear  [unfilled], [Consult Letter:] : I had the pleasure of evaluating your patient, [unfilled]. [Please see my note below.] : Please see my note below. [Consult Closing:] : Thank you very much for allowing me to participate in the care of this patient.  If you have any questions, please do not hesitate to contact me. [Sincerely,] : Sincerely, [FreeTextEntry3] : Aliyah Lewis MD\par  [DrErika  ___] : Dr. MORAN

## 2019-05-08 ENCOUNTER — APPOINTMENT (OUTPATIENT)
Dept: HEART AND VASCULAR | Facility: CLINIC | Age: 49
End: 2019-05-08
Payer: COMMERCIAL

## 2019-05-08 VITALS
TEMPERATURE: 95 F | HEIGHT: 58.98 IN | SYSTOLIC BLOOD PRESSURE: 102 MMHG | OXYGEN SATURATION: 93 % | HEART RATE: 91 BPM | BODY MASS INDEX: 32.46 KG/M2 | DIASTOLIC BLOOD PRESSURE: 72 MMHG | WEIGHT: 161 LBS

## 2019-05-08 DIAGNOSIS — I47.2 VENTRICULAR TACHYCARDIA: ICD-10-CM

## 2019-05-08 PROCEDURE — 99215 OFFICE O/P EST HI 40 MIN: CPT

## 2019-05-08 PROCEDURE — 93000 ELECTROCARDIOGRAM COMPLETE: CPT

## 2019-05-08 RX ORDER — METOLAZONE 5 MG/1
5 TABLET ORAL DAILY
Qty: 90 | Refills: 3 | Status: ACTIVE | COMMUNITY
Start: 2019-05-08 | End: 1900-01-01

## 2019-05-08 RX ORDER — FUROSEMIDE 40 MG/1
40 TABLET ORAL DAILY
Qty: 1 | Refills: 0 | Status: COMPLETED | COMMUNITY
End: 2019-05-08

## 2019-05-08 RX ORDER — ALPRAZOLAM 0.25 MG/1
0.25 TABLET ORAL
Qty: 2 | Refills: 0 | Status: ACTIVE | COMMUNITY
Start: 2019-04-17

## 2019-05-09 VITALS
HEART RATE: 106 BPM | HEIGHT: 59 IN | SYSTOLIC BLOOD PRESSURE: 113 MMHG | OXYGEN SATURATION: 96 % | RESPIRATION RATE: 20 BRPM | DIASTOLIC BLOOD PRESSURE: 83 MMHG | WEIGHT: 160.72 LBS | TEMPERATURE: 98 F

## 2019-05-09 RX ORDER — PREGABALIN 225 MG/1
1 CAPSULE ORAL
Qty: 0 | Refills: 0 | DISCHARGE

## 2019-05-09 RX ORDER — TRIAMTERENE/HYDROCHLOROTHIAZID 75 MG-50MG
1 TABLET ORAL
Qty: 0 | Refills: 0 | DISCHARGE

## 2019-05-09 RX ORDER — KETOCONAZOLE 20 MG/G
1 AEROSOL, FOAM TOPICAL
Qty: 0 | Refills: 0 | DISCHARGE

## 2019-05-09 RX ORDER — FERROUS SULFATE 325(65) MG
2 TABLET ORAL
Qty: 0 | Refills: 0 | DISCHARGE

## 2019-05-09 RX ORDER — FLUTICASONE PROPIONATE 50 MCG
1 SPRAY, SUSPENSION NASAL
Qty: 0 | Refills: 0 | DISCHARGE

## 2019-05-09 NOTE — HISTORY OF PRESENT ILLNESS
[FreeTextEntry1] : 47 yo obese female  with a PMhx HTN, Sickle Cell Trait,  ventriculomegaly \par initially presented to a clinic today (9/11/2018) due to ongoing chest pain. 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 Patient notes she was belching/burping and gasping for \par air when chest pain occurred and symptoms self-resolved within several minutes. \par Patient went to an outpatient clinic,  EMS was called and patient took  \par mg x one dose in route.  Pt. states she has had similar symptoms a year ago and \par went to see her PMD who performed an ECG revealing normal finding.  However, \par pt. never had a stress test or any other cardiac workup. On arrival to [facility] ED; \par Vital signs stable: BP: 132/86, HR: 80s, RR: 19, Afebrile, and O2: 96% RA. 12 \par Lead EKG revealed Sinus Rhythm with nonspecific TW changes in V6, I, AVL \par (CONFRIM). Pertinent lab values revealed include WBC: 12.3K, D Dimer negative, \par K: 3.1, TSH WNL, BNP 1,233 and Troponin 0.819. ACS Protocol was initiated and \par patient was loaded with  mg, Plavix 600 mg, Heparin gtt with bolus \par started and patient received Lipitor 80 mg daily, Lopressor 25 mg PO x one \par dose, Protonix 40 mg IV x one dose and Kdur 40 mEq orally x one dose. Patient \par transferred to [facility] for management of R/I NSTEMI and plan for cardiac \par catheterization with possible intervention if clinically indicated. Peak \par Troponin T 0.26 . Patient underwent diagnostic cardiac cath 9/12 \par revealing distal apical LAD 50-60% (small vessel), LM RCA and LCx normal, LVEF \par 55%, LVEDP 14 mm Hg. \par \par USOH EKG unchanged echo reviewed\par \par 2/20/19 \par jan 31 Marion ed fro eyes occulatiosn and slurred speech\par USOH, 100% compliant with meds\par location: chest\par duration: na\par  modifying factors: na\par timing: na\par severity: 0/10\par EKG unchanged from prior (in chart)\par consultation notes reviewed where appropriate\par \par 3/28/19 MRI results reviewed, will need to r/o cardiac etiology \par ekg unchanged\par no cp sob\par \par 4/12/19 has eventm onitor on to eval for afib.  ekg nsr low vltage\par edu with biventricularthickening and biatrial enlargement suggestive of restrictive cm - posibe amyliod\par \par 5/9/19 on torsemide per HF recs, but still SOB with LE edema\par CMRI sugegstive of amyloid, h/o recs aprpeciated likely AL \par getting fat pad bx this friday\par

## 2019-05-09 NOTE — DISCUSSION/SUMMARY
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

## 2019-05-09 NOTE — PHYSICAL EXAM
[General Appearance - Well Developed] : well developed [General Appearance - Well Nourished] : well nourished [Normal Appearance] : normal appearance [Well Groomed] : well groomed [No Deformities] : no deformities [General Appearance - In No Acute Distress] : no acute distress [Eyelids - No Xanthelasma] : the eyelids demonstrated no xanthelasmas [Normal Conjunctiva] : the conjunctiva exhibited no abnormalities [Normal Oral Mucosa] : normal oral mucosa [No Oral Pallor] : no oral pallor [Normal Jugular Venous V Waves Present] : normal jugular venous V waves present [No Oral Cyanosis] : no oral cyanosis [Normal Jugular Venous A Waves Present] : normal jugular venous A waves present [No Jugular Venous Forbes A Waves] : no jugular venous forbes A waves [Respiration, Rhythm And Depth] : normal respiratory rhythm and effort [Exaggerated Use Of Accessory Muscles For Inspiration] : no accessory muscle use [Auscultation Breath Sounds / Voice Sounds] : lungs were clear to auscultation bilaterally [Heart Rate And Rhythm] : heart rate and rhythm were normal [Heart Sounds] : normal S1 and S2 [Murmurs] : no murmurs present [Abdomen Tenderness] : non-tender [Abdomen Soft] : soft [Abnormal Walk] : normal gait [Abdomen Mass (___ Cm)] : no abdominal mass palpated [Gait - Sufficient For Exercise Testing] : the gait was sufficient for exercise testing [Nail Clubbing] : no clubbing of the fingernails [Cyanosis, Localized] : no localized cyanosis [Petechial Hemorrhages (___cm)] : no petechial hemorrhages [] : no rash [No Venous Stasis] : no venous stasis [Skin Color & Pigmentation] : normal skin color and pigmentation [Skin Lesions] : no skin lesions [No Skin Ulcers] : no skin ulcer [Oriented To Time, Place, And Person] : oriented to person, place, and time [Affect] : the affect was normal [No Xanthoma] : no  xanthoma was observed [Mood] : the mood was normal [No Anxiety] : not feeling anxious

## 2019-05-09 NOTE — ASU PATIENT PROFILE, ADULT - PSH
Adenomatous polyp of colon  polyp removed no cancer of colon  History of surgery  dilation and evacuation  Ventriculomegaly of brain, congenital

## 2019-05-09 NOTE — ASU PATIENT PROFILE, ADULT - PMH
HTN (hypertension) Cerebrospinal fluid leak    CVA (cerebral vascular accident)  embolic stroke 1/2019 L side with R side weakness  Heart failure    HTN (hypertension)    MI (myocardial infarction)

## 2019-05-10 ENCOUNTER — RESULT REVIEW (OUTPATIENT)
Age: 49
End: 2019-05-10

## 2019-05-10 ENCOUNTER — INPATIENT (INPATIENT)
Facility: HOSPITAL | Age: 49
LOS: 4 days | Discharge: HOME | DRG: 917 | End: 2019-05-15
Attending: HOSPITALIST | Admitting: HOSPITALIST
Payer: COMMERCIAL

## 2019-05-10 DIAGNOSIS — Q04.8 OTHER SPECIFIED CONGENITAL MALFORMATIONS OF BRAIN: Chronic | ICD-10-CM

## 2019-05-10 DIAGNOSIS — Z98.890 OTHER SPECIFIED POSTPROCEDURAL STATES: Chronic | ICD-10-CM

## 2019-05-10 DIAGNOSIS — D12.6 BENIGN NEOPLASM OF COLON, UNSPECIFIED: Chronic | ICD-10-CM

## 2019-05-10 LAB
ALBUMIN SERPL ELPH-MCNC: 3.2 G/DL — LOW (ref 3.3–5)
ALP SERPL-CCNC: 100 U/L — SIGNIFICANT CHANGE UP (ref 40–120)
ALT FLD-CCNC: 62 U/L — HIGH (ref 10–45)
ANION GAP SERPL CALC-SCNC: 12 MMOL/L — SIGNIFICANT CHANGE UP (ref 5–17)
ANION GAP SERPL CALC-SCNC: 14 MMOL/L — SIGNIFICANT CHANGE UP (ref 5–17)
ANION GAP SERPL CALC-SCNC: 14 MMOL/L — SIGNIFICANT CHANGE UP (ref 5–17)
APTT BLD: 23.1 SEC — LOW (ref 27.5–36.3)
AST SERPL-CCNC: 47 U/L — HIGH (ref 10–40)
BASE EXCESS BLDA CALC-SCNC: -1.9 MMOL/L — SIGNIFICANT CHANGE UP (ref -2–3)
BASE EXCESS BLDA CALC-SCNC: -7 MMOL/L — LOW (ref -2–3)
BASE EXCESS BLDA CALC-SCNC: -7.6 MMOL/L — LOW (ref -2–3)
BASE EXCESS BLDA CALC-SCNC: 0.1 MMOL/L — SIGNIFICANT CHANGE UP (ref -2–3)
BASE EXCESS BLDA CALC-SCNC: 0.8 MMOL/L — SIGNIFICANT CHANGE UP (ref -2–3)
BASOPHILS # BLD AUTO: 0.04 K/UL — SIGNIFICANT CHANGE UP (ref 0–0.2)
BASOPHILS NFR BLD AUTO: 0.3 % — SIGNIFICANT CHANGE UP (ref 0–2)
BILIRUB SERPL-MCNC: 1.1 MG/DL — SIGNIFICANT CHANGE UP (ref 0.2–1.2)
BUN SERPL-MCNC: 19 MG/DL — SIGNIFICANT CHANGE UP (ref 7–23)
BUN SERPL-MCNC: 21 MG/DL — SIGNIFICANT CHANGE UP (ref 7–23)
BUN SERPL-MCNC: 22 MG/DL — SIGNIFICANT CHANGE UP (ref 7–23)
CA-I BLDA-SCNC: 1.24 MMOL/L — SIGNIFICANT CHANGE UP (ref 1.12–1.3)
CA-I BLDA-SCNC: 1.44 MMOL/L — HIGH (ref 1.12–1.3)
CALCIUM SERPL-MCNC: 9.3 MG/DL — SIGNIFICANT CHANGE UP (ref 8.4–10.5)
CALCIUM SERPL-MCNC: 9.3 MG/DL — SIGNIFICANT CHANGE UP (ref 8.4–10.5)
CALCIUM SERPL-MCNC: 9.8 MG/DL — SIGNIFICANT CHANGE UP (ref 8.4–10.5)
CHLORIDE SERPL-SCNC: 102 MMOL/L — SIGNIFICANT CHANGE UP (ref 96–108)
CHLORIDE SERPL-SCNC: 104 MMOL/L — SIGNIFICANT CHANGE UP (ref 96–108)
CHLORIDE SERPL-SCNC: 105 MMOL/L — SIGNIFICANT CHANGE UP (ref 96–108)
CO2 SERPL-SCNC: 21 MMOL/L — LOW (ref 22–31)
CO2 SERPL-SCNC: 24 MMOL/L — SIGNIFICANT CHANGE UP (ref 22–31)
CO2 SERPL-SCNC: 25 MMOL/L — SIGNIFICANT CHANGE UP (ref 22–31)
COHGB MFR BLDA: 1.5 % — SIGNIFICANT CHANGE UP
COHGB MFR BLDA: 2 % — HIGH
CREAT SERPL-MCNC: 0.91 MG/DL — SIGNIFICANT CHANGE UP (ref 0.5–1.3)
CREAT SERPL-MCNC: 1.15 MG/DL — SIGNIFICANT CHANGE UP (ref 0.5–1.3)
CREAT SERPL-MCNC: 1.27 MG/DL — SIGNIFICANT CHANGE UP (ref 0.5–1.3)
EOSINOPHIL # BLD AUTO: 0.03 K/UL — SIGNIFICANT CHANGE UP (ref 0–0.5)
EOSINOPHIL NFR BLD AUTO: 0.2 % — SIGNIFICANT CHANGE UP (ref 0–6)
GAS PNL BLDA: SIGNIFICANT CHANGE UP
GLUCOSE BLDC GLUCOMTR-MCNC: 116 MG/DL — HIGH (ref 70–99)
GLUCOSE BLDC GLUCOMTR-MCNC: 131 MG/DL — HIGH (ref 70–99)
GLUCOSE SERPL-MCNC: 135 MG/DL — HIGH (ref 70–99)
GLUCOSE SERPL-MCNC: 142 MG/DL — HIGH (ref 70–99)
GLUCOSE SERPL-MCNC: 215 MG/DL — HIGH (ref 70–99)
HCO3 BLDA-SCNC: 18 MMOL/L — LOW (ref 21–28)
HCO3 BLDA-SCNC: 21 MMOL/L — SIGNIFICANT CHANGE UP (ref 21–28)
HCO3 BLDA-SCNC: 22 MMOL/L — SIGNIFICANT CHANGE UP (ref 21–28)
HCO3 BLDA-SCNC: 25 MMOL/L — SIGNIFICANT CHANGE UP (ref 21–28)
HCO3 BLDA-SCNC: 26 MMOL/L — SIGNIFICANT CHANGE UP (ref 21–28)
HCT VFR BLD CALC: 43 % — SIGNIFICANT CHANGE UP (ref 34.5–45)
HCT VFR BLD CALC: 45.5 % — HIGH (ref 34.5–45)
HGB BLD-MCNC: 14.8 G/DL — SIGNIFICANT CHANGE UP (ref 11.5–15.5)
HGB BLD-MCNC: 15.5 G/DL — SIGNIFICANT CHANGE UP (ref 11.5–15.5)
HGB BLDA-MCNC: 15 G/DL — SIGNIFICANT CHANGE UP (ref 11.5–15.5)
HGB BLDA-MCNC: 16 G/DL — HIGH (ref 11.5–15.5)
IMM GRANULOCYTES NFR BLD AUTO: 0.6 % — SIGNIFICANT CHANGE UP (ref 0–1.5)
INR BLD: 1.38 — HIGH (ref 0.88–1.16)
LACTATE SERPL-SCNC: 2.1 MMOL/L — HIGH (ref 0.5–2)
LACTATE SERPL-SCNC: 2.8 MMOL/L — HIGH (ref 0.5–2)
LACTATE SERPL-SCNC: 3.6 MMOL/L — HIGH (ref 0.5–2)
LYMPHOCYTES # BLD AUTO: 14.2 % — SIGNIFICANT CHANGE UP (ref 13–44)
LYMPHOCYTES # BLD AUTO: 2.06 K/UL — SIGNIFICANT CHANGE UP (ref 1–3.3)
MAGNESIUM SERPL-MCNC: 1.7 MG/DL — SIGNIFICANT CHANGE UP (ref 1.6–2.6)
MAGNESIUM SERPL-MCNC: 2 MG/DL — SIGNIFICANT CHANGE UP (ref 1.6–2.6)
MCHC RBC-ENTMCNC: 27.6 PG — SIGNIFICANT CHANGE UP (ref 27–34)
MCHC RBC-ENTMCNC: 27.9 PG — SIGNIFICANT CHANGE UP (ref 27–34)
MCHC RBC-ENTMCNC: 34.1 GM/DL — SIGNIFICANT CHANGE UP (ref 32–36)
MCHC RBC-ENTMCNC: 34.4 GM/DL — SIGNIFICANT CHANGE UP (ref 32–36)
MCV RBC AUTO: 81 FL — SIGNIFICANT CHANGE UP (ref 80–100)
MCV RBC AUTO: 81.1 FL — SIGNIFICANT CHANGE UP (ref 80–100)
METHGB MFR BLDA: 0.5 % — SIGNIFICANT CHANGE UP
METHGB MFR BLDA: 0.6 % — SIGNIFICANT CHANGE UP
MONOCYTES # BLD AUTO: 0.76 K/UL — SIGNIFICANT CHANGE UP (ref 0–0.9)
MONOCYTES NFR BLD AUTO: 5.3 % — SIGNIFICANT CHANGE UP (ref 2–14)
NEUTROPHILS # BLD AUTO: 11.49 K/UL — HIGH (ref 1.8–7.4)
NEUTROPHILS NFR BLD AUTO: 79.4 % — HIGH (ref 43–77)
NRBC # BLD: 0 /100 WBCS — SIGNIFICANT CHANGE UP (ref 0–0)
NRBC # BLD: 0 /100 WBCS — SIGNIFICANT CHANGE UP (ref 0–0)
O2 CT VFR BLDA CALC: 17.5 ML/DL — SIGNIFICANT CHANGE UP (ref 15–23)
O2 CT VFR BLDA CALC: SIGNIFICANT CHANGE UP (ref 15–23)
OXYHGB MFR BLDA: 78 % — LOW (ref 94–100)
OXYHGB MFR BLDA: 93 % — LOW (ref 94–100)
PCO2 BLDA: 35 MMHG — SIGNIFICANT CHANGE UP (ref 32–45)
PCO2 BLDA: 38 MMHG — SIGNIFICANT CHANGE UP (ref 32–45)
PCO2 BLDA: 44 MMHG — SIGNIFICANT CHANGE UP (ref 32–45)
PCO2 BLDA: 44 MMHG — SIGNIFICANT CHANGE UP (ref 32–45)
PCO2 BLDA: 50 MMHG — HIGH (ref 32–45)
PCO2 BLDA: 57 MMHG — HIGH (ref 32–45)
PH BLDA: 7.19 — CRITICAL LOW (ref 7.35–7.45)
PH BLDA: 7.29 — LOW (ref 7.35–7.45)
PH BLDA: 7.33 — LOW (ref 7.35–7.45)
PH BLDA: 7.38 — SIGNIFICANT CHANGE UP (ref 7.35–7.45)
PH BLDA: 7.39 — SIGNIFICANT CHANGE UP (ref 7.35–7.45)
PH BLDA: 7.4 — SIGNIFICANT CHANGE UP (ref 7.35–7.45)
PHOSPHATE SERPL-MCNC: 4.6 MG/DL — HIGH (ref 2.5–4.5)
PLATELET # BLD AUTO: 273 K/UL — SIGNIFICANT CHANGE UP (ref 150–400)
PLATELET # BLD AUTO: 308 K/UL — SIGNIFICANT CHANGE UP (ref 150–400)
PO2 BLDA: 126 MMHG — HIGH (ref 83–108)
PO2 BLDA: 216 MMHG — HIGH (ref 83–108)
PO2 BLDA: 59 MMHG — LOW (ref 83–108)
PO2 BLDA: 59 MMHG — LOW (ref 83–108)
PO2 BLDA: 87 MMHG — SIGNIFICANT CHANGE UP (ref 83–108)
PO2 BLDA: 92 MMHG — SIGNIFICANT CHANGE UP (ref 83–108)
POTASSIUM BLDA-SCNC: 2.9 MMOL/L — LOW (ref 3.5–4.9)
POTASSIUM BLDA-SCNC: 3.9 MMOL/L — SIGNIFICANT CHANGE UP (ref 3.5–4.9)
POTASSIUM SERPL-MCNC: 3.2 MMOL/L — LOW (ref 3.5–5.3)
POTASSIUM SERPL-MCNC: 5.5 MMOL/L — HIGH (ref 3.5–5.3)
POTASSIUM SERPL-MCNC: 6.1 MMOL/L — HIGH (ref 3.5–5.3)
POTASSIUM SERPL-SCNC: 3.2 MMOL/L — LOW (ref 3.5–5.3)
POTASSIUM SERPL-SCNC: 5.5 MMOL/L — HIGH (ref 3.5–5.3)
POTASSIUM SERPL-SCNC: 6.1 MMOL/L — HIGH (ref 3.5–5.3)
PROT SERPL-MCNC: 6.1 G/DL — SIGNIFICANT CHANGE UP (ref 6–8.3)
PROTHROM AB SERPL-ACNC: 15.7 SEC — HIGH (ref 10–12.9)
RBC # BLD: 5.31 M/UL — HIGH (ref 3.8–5.2)
RBC # BLD: 5.61 M/UL — HIGH (ref 3.8–5.2)
RBC # FLD: 14.7 % — HIGH (ref 10.3–14.5)
RBC # FLD: 15 % — HIGH (ref 10.3–14.5)
SAO2 % BLDA: 100 % — SIGNIFICANT CHANGE UP (ref 95–100)
SAO2 % BLDA: 80 % — LOW (ref 95–100)
SAO2 % BLDA: 87 % — LOW (ref 95–100)
SAO2 % BLDA: 95 % — SIGNIFICANT CHANGE UP (ref 95–100)
SAO2 % BLDA: 96 % — SIGNIFICANT CHANGE UP (ref 95–100)
SAO2 % BLDA: 98 % — SIGNIFICANT CHANGE UP (ref 95–100)
SODIUM BLDA-SCNC: 137 MMOL/L — LOW (ref 138–146)
SODIUM BLDA-SCNC: 142 MMOL/L — SIGNIFICANT CHANGE UP (ref 138–146)
SODIUM SERPL-SCNC: 139 MMOL/L — SIGNIFICANT CHANGE UP (ref 135–145)
SODIUM SERPL-SCNC: 141 MMOL/L — SIGNIFICANT CHANGE UP (ref 135–145)
SODIUM SERPL-SCNC: 141 MMOL/L — SIGNIFICANT CHANGE UP (ref 135–145)
WBC # BLD: 14.46 K/UL — HIGH (ref 3.8–10.5)
WBC # BLD: 18.22 K/UL — HIGH (ref 3.8–10.5)
WBC # FLD AUTO: 14.46 K/UL — HIGH (ref 3.8–10.5)
WBC # FLD AUTO: 18.22 K/UL — HIGH (ref 3.8–10.5)

## 2019-05-10 PROCEDURE — 93306 TTE W/DOPPLER COMPLETE: CPT | Mod: 26

## 2019-05-10 PROCEDURE — 99292 CRITICAL CARE ADDL 30 MIN: CPT | Mod: 25

## 2019-05-10 PROCEDURE — 71045 X-RAY EXAM CHEST 1 VIEW: CPT | Mod: 26

## 2019-05-10 PROCEDURE — 99291 CRITICAL CARE FIRST HOUR: CPT

## 2019-05-10 PROCEDURE — 99291 CRITICAL CARE FIRST HOUR: CPT | Mod: 25

## 2019-05-10 PROCEDURE — 22903 EXC ABD LES SC 3 CM/>: CPT

## 2019-05-10 PROCEDURE — 99223 1ST HOSP IP/OBS HIGH 75: CPT

## 2019-05-10 RX ORDER — MIDAZOLAM HYDROCHLORIDE 1 MG/ML
2 INJECTION, SOLUTION INTRAMUSCULAR; INTRAVENOUS ONCE
Refills: 0 | Status: DISCONTINUED | OUTPATIENT
Start: 2019-05-10 | End: 2019-05-10

## 2019-05-10 RX ORDER — ATORVASTATIN CALCIUM 80 MG/1
80 TABLET, FILM COATED ORAL AT BEDTIME
Refills: 0 | Status: DISCONTINUED | OUTPATIENT
Start: 2019-05-10 | End: 2019-05-10

## 2019-05-10 RX ORDER — FUROSEMIDE 40 MG
40 TABLET ORAL ONCE
Refills: 0 | Status: COMPLETED | OUTPATIENT
Start: 2019-05-10 | End: 2019-05-10

## 2019-05-10 RX ORDER — MIDAZOLAM HYDROCHLORIDE 1 MG/ML
0.02 INJECTION, SOLUTION INTRAMUSCULAR; INTRAVENOUS
Qty: 100 | Refills: 0 | Status: DISCONTINUED | OUTPATIENT
Start: 2019-05-10 | End: 2019-05-10

## 2019-05-10 RX ORDER — FENTANYL CITRATE 50 UG/ML
0.5 INJECTION INTRAVENOUS
Qty: 2500 | Refills: 0 | Status: DISCONTINUED | OUTPATIENT
Start: 2019-05-10 | End: 2019-05-11

## 2019-05-10 RX ORDER — BUPIVACAINE 13.3 MG/ML
20 INJECTION, SUSPENSION, LIPOSOMAL INFILTRATION ONCE
Refills: 0 | Status: DISCONTINUED | OUTPATIENT
Start: 2019-05-10 | End: 2019-05-10

## 2019-05-10 RX ORDER — CHLORHEXIDINE GLUCONATE 213 G/1000ML
1 SOLUTION TOPICAL
Refills: 0 | Status: DISCONTINUED | OUTPATIENT
Start: 2019-05-10 | End: 2019-05-15

## 2019-05-10 RX ORDER — AMLODIPINE BESYLATE 2.5 MG/1
5 TABLET ORAL DAILY
Refills: 0 | Status: DISCONTINUED | OUTPATIENT
Start: 2019-05-10 | End: 2019-05-10

## 2019-05-10 RX ORDER — HEPARIN SODIUM 5000 [USP'U]/ML
5000 INJECTION INTRAVENOUS; SUBCUTANEOUS EVERY 8 HOURS
Refills: 0 | Status: DISCONTINUED | OUTPATIENT
Start: 2019-05-10 | End: 2019-05-15

## 2019-05-10 RX ORDER — CLOPIDOGREL BISULFATE 75 MG/1
75 TABLET, FILM COATED ORAL DAILY
Refills: 0 | Status: DISCONTINUED | OUTPATIENT
Start: 2019-05-11 | End: 2019-05-15

## 2019-05-10 RX ORDER — FUROSEMIDE 40 MG
80 TABLET ORAL ONCE
Refills: 0 | Status: COMPLETED | OUTPATIENT
Start: 2019-05-10 | End: 2019-05-10

## 2019-05-10 RX ORDER — METOPROLOL TARTRATE 50 MG
25 TABLET ORAL
Refills: 0 | Status: DISCONTINUED | OUTPATIENT
Start: 2019-05-10 | End: 2019-05-10

## 2019-05-10 RX ORDER — KETOCONAZOLE 20 MG/G
1 AEROSOL, FOAM TOPICAL ONCE
Refills: 0 | Status: DISCONTINUED | OUTPATIENT
Start: 2019-05-10 | End: 2019-05-10

## 2019-05-10 RX ORDER — POTASSIUM CHLORIDE 20 MEQ
20 PACKET (EA) ORAL
Refills: 0 | Status: COMPLETED | OUTPATIENT
Start: 2019-05-10 | End: 2019-05-10

## 2019-05-10 RX ORDER — CLOPIDOGREL BISULFATE 75 MG/1
75 TABLET, FILM COATED ORAL ONCE
Refills: 0 | Status: COMPLETED | OUTPATIENT
Start: 2019-05-10 | End: 2019-05-10

## 2019-05-10 RX ORDER — INSULIN HUMAN 100 [IU]/ML
10 INJECTION, SOLUTION SUBCUTANEOUS ONCE
Refills: 0 | Status: COMPLETED | OUTPATIENT
Start: 2019-05-10 | End: 2019-05-10

## 2019-05-10 RX ORDER — ASPIRIN/CALCIUM CARB/MAGNESIUM 324 MG
81 TABLET ORAL ONCE
Refills: 0 | Status: DISCONTINUED | OUTPATIENT
Start: 2019-05-10 | End: 2019-05-10

## 2019-05-10 RX ORDER — MAGNESIUM SULFATE 500 MG/ML
2 VIAL (ML) INJECTION ONCE
Refills: 0 | Status: COMPLETED | OUTPATIENT
Start: 2019-05-10 | End: 2019-05-10

## 2019-05-10 RX ORDER — DEXTROSE 50 % IN WATER 50 %
50 SYRINGE (ML) INTRAVENOUS ONCE
Refills: 0 | Status: COMPLETED | OUTPATIENT
Start: 2019-05-10 | End: 2019-05-10

## 2019-05-10 RX ORDER — HEPARIN SODIUM 5000 [USP'U]/ML
5000 INJECTION INTRAVENOUS; SUBCUTANEOUS EVERY 8 HOURS
Refills: 0 | Status: DISCONTINUED | OUTPATIENT
Start: 2019-05-10 | End: 2019-05-10

## 2019-05-10 RX ORDER — ASPIRIN/CALCIUM CARB/MAGNESIUM 324 MG
81 TABLET ORAL DAILY
Refills: 0 | Status: DISCONTINUED | OUTPATIENT
Start: 2019-05-10 | End: 2019-05-10

## 2019-05-10 RX ORDER — MIDAZOLAM HYDROCHLORIDE 1 MG/ML
0.02 INJECTION, SOLUTION INTRAMUSCULAR; INTRAVENOUS
Qty: 100 | Refills: 0 | Status: DISCONTINUED | OUTPATIENT
Start: 2019-05-10 | End: 2019-05-11

## 2019-05-10 RX ORDER — FENTANYL CITRATE 50 UG/ML
0.5 INJECTION INTRAVENOUS
Qty: 2500 | Refills: 0 | Status: DISCONTINUED | OUTPATIENT
Start: 2019-05-10 | End: 2019-05-10

## 2019-05-10 RX ORDER — CLOPIDOGREL BISULFATE 75 MG/1
75 TABLET, FILM COATED ORAL ONCE
Refills: 0 | Status: DISCONTINUED | OUTPATIENT
Start: 2019-05-10 | End: 2019-05-10

## 2019-05-10 RX ORDER — PANTOPRAZOLE SODIUM 20 MG/1
40 TABLET, DELAYED RELEASE ORAL DAILY
Refills: 0 | Status: DISCONTINUED | OUTPATIENT
Start: 2019-05-10 | End: 2019-05-15

## 2019-05-10 RX ORDER — FUROSEMIDE 40 MG
40 TABLET ORAL DAILY
Refills: 0 | Status: DISCONTINUED | OUTPATIENT
Start: 2019-05-10 | End: 2019-05-10

## 2019-05-10 RX ORDER — FUROSEMIDE 40 MG
40 TABLET ORAL ONCE
Refills: 0 | Status: DISCONTINUED | OUTPATIENT
Start: 2019-05-10 | End: 2019-05-10

## 2019-05-10 RX ORDER — FERROUS SULFATE 325(65) MG
325 TABLET ORAL DAILY
Refills: 0 | Status: DISCONTINUED | OUTPATIENT
Start: 2019-05-10 | End: 2019-05-10

## 2019-05-10 RX ORDER — CHLORHEXIDINE GLUCONATE 213 G/1000ML
15 SOLUTION TOPICAL EVERY 12 HOURS
Refills: 0 | Status: DISCONTINUED | OUTPATIENT
Start: 2019-05-10 | End: 2019-05-11

## 2019-05-10 RX ORDER — NOREPINEPHRINE BITARTRATE/D5W 8 MG/250ML
0.05 PLASTIC BAG, INJECTION (ML) INTRAVENOUS
Qty: 16 | Refills: 0 | Status: DISCONTINUED | OUTPATIENT
Start: 2019-05-10 | End: 2019-05-11

## 2019-05-10 RX ORDER — CLOPIDOGREL BISULFATE 75 MG/1
75 TABLET, FILM COATED ORAL DAILY
Refills: 0 | Status: DISCONTINUED | OUTPATIENT
Start: 2019-05-10 | End: 2019-05-10

## 2019-05-10 RX ORDER — ASPIRIN/CALCIUM CARB/MAGNESIUM 324 MG
81 TABLET ORAL DAILY
Refills: 0 | Status: DISCONTINUED | OUTPATIENT
Start: 2019-05-10 | End: 2019-05-15

## 2019-05-10 RX ORDER — NOREPINEPHRINE BITARTRATE/D5W 8 MG/250ML
0.05 PLASTIC BAG, INJECTION (ML) INTRAVENOUS
Qty: 16 | Refills: 0 | Status: DISCONTINUED | OUTPATIENT
Start: 2019-05-10 | End: 2019-05-10

## 2019-05-10 RX ADMIN — CLOPIDOGREL BISULFATE 75 MILLIGRAM(S): 75 TABLET, FILM COATED ORAL at 17:17

## 2019-05-10 RX ADMIN — HEPARIN SODIUM 5000 UNIT(S): 5000 INJECTION INTRAVENOUS; SUBCUTANEOUS at 13:48

## 2019-05-10 RX ADMIN — CHLORHEXIDINE GLUCONATE 15 MILLILITER(S): 213 SOLUTION TOPICAL at 17:18

## 2019-05-10 RX ADMIN — HEPARIN SODIUM 5000 UNIT(S): 5000 INJECTION INTRAVENOUS; SUBCUTANEOUS at 21:59

## 2019-05-10 RX ADMIN — Medication 3.71 MICROGRAM(S)/KG/MIN: at 21:03

## 2019-05-10 RX ADMIN — CHLORHEXIDINE GLUCONATE 1 APPLICATION(S): 213 SOLUTION TOPICAL at 10:29

## 2019-05-10 RX ADMIN — Medication 50 MILLIEQUIVALENT(S): at 19:20

## 2019-05-10 RX ADMIN — Medication 50 MILLILITER(S): at 22:22

## 2019-05-10 RX ADMIN — MIDAZOLAM HYDROCHLORIDE 2 MILLIGRAM(S): 1 INJECTION, SOLUTION INTRAMUSCULAR; INTRAVENOUS at 10:20

## 2019-05-10 RX ADMIN — PANTOPRAZOLE SODIUM 40 MILLIGRAM(S): 20 TABLET, DELAYED RELEASE ORAL at 12:28

## 2019-05-10 RX ADMIN — Medication 80 MILLIGRAM(S): at 10:58

## 2019-05-10 RX ADMIN — Medication 50 MILLIEQUIVALENT(S): at 17:18

## 2019-05-10 RX ADMIN — MIDAZOLAM HYDROCHLORIDE 1.46 MG/KG/HR: 1 INJECTION, SOLUTION INTRAMUSCULAR; INTRAVENOUS at 10:29

## 2019-05-10 RX ADMIN — Medication 40 MILLIGRAM(S): at 17:17

## 2019-05-10 RX ADMIN — Medication 40 MILLIGRAM(S): at 23:11

## 2019-05-10 RX ADMIN — Medication 81 MILLIGRAM(S): at 17:16

## 2019-05-10 RX ADMIN — INSULIN HUMAN 10 UNIT(S): 100 INJECTION, SOLUTION SUBCUTANEOUS at 22:22

## 2019-05-10 RX ADMIN — FENTANYL CITRATE 3.96 MICROGRAM(S)/KG/HR: 50 INJECTION INTRAVENOUS at 11:26

## 2019-05-10 RX ADMIN — Medication 100 GRAM(S): at 13:07

## 2019-05-10 RX ADMIN — Medication 50 MILLIEQUIVALENT(S): at 13:07

## 2019-05-10 NOTE — PROGRESS NOTE ADULT - SUBJECTIVE AND OBJECTIVE BOX
Patient is a 49y old  Female who presents with a chief complaint of cardiac arrest after general anesthesia induction (10 May 2019 09:54)    HPI:  49yoF with PMH uncompensated dCHF, CVA, NSTEMI, HTN, sickle cell trait, and concern for amyloidosis presents for scheduled abdominal fat pad biopsy to confirm diagnosis of amyloidosis.    After induction of general anesthesia and intubation, patient experienced cardiac arrest/PEA. Chest compressions were started and epinephrine was given. ROSC was achieved. Discussion was had with cardiology and decision was made to proceed with the procedure given the likely underlying etiology of her arrest. Patient remained on pressor support throughout the case. Procedure otherwise uneventful. She was then transferred to the CCU post-operatively, intubated and on an epinephrine gtt. (10 May 2019 09:54)    PAST MEDICAL & SURGICAL HISTORY:  CVA (cerebral vascular accident): embolic stroke 1/2019 L side with R side weakness  Heart failure  MI (myocardial infarction)  Cerebrospinal fluid leak  HTN (hypertension)  History of surgery: dilation and evacuation  Adenomatous polyp of colon: polyp removed no cancer of colon  Ventriculomegaly of brain, congenital    FAMILY HISTORY:  Family history of cerebrovascular accident (CVA) (Mother)    Social:  Allergies    No Known Allergies    Intolerances    	  MEDICATIONS:        fentaNYL   Infusion. 0.5 MICROgram(s)/kG/Hr IV Continuous <Continuous>  midazolam Infusion 0.02 mG/kG/Hr IV Continuous <Continuous>    pantoprazole  Injectable 40 milliGRAM(s) IV Push daily      aspirin  chewable 81 milliGRAM(s) Oral daily  chlorhexidine 0.12% Liquid 15 milliLiter(s) Oral Mucosa every 12 hours  chlorhexidine 2% Cloths 1 Application(s) Topical <User Schedule>  clopidogrel Tablet 75 milliGRAM(s) Oral daily  potassium chloride  20 mEq/100 mL IVPB 20 milliEquivalent(s) IV Intermittent every 2 hours            PHYSICAL EXAM:  T(C): 35.7 (05-10-19 @ 10:00), Max: 35.7 (05-10-19 @ 10:00)  HR: 98 (05-10-19 @ 12:00) (98 - 128)  BP: 89/65 (05-10-19 @ 12:00) (89/65 - 140/108)  RR: 12 (05-10-19 @ 12:00) (12 - 16)  SpO2: 97% (05-10-19 @ 12:00) (80% - 98%)  Wt(kg): --  I&O's Summary    10 May 2019 07:01  -  10 May 2019 12:49  --------------------------------------------------------  IN: 17.4 mL / OUT: 382 mL / NET: -364.6 mL      Height (cm): 149.86 (05-10 @ 07:22)  Weight (kg): 79.1 (05-10 @ 10:30)  BMI (kg/m2): 35.2 (05-10 @ 10:30)  BSA (m2): 1.74 (05-10 @ 10:30)    Gen: Intubated, sedated  Neck: + JVD on positive pressure ventilation  Cardiovascular: Normal S1 S2, No murmurs,    Respiratory: rales b/l, vented  Gastrointestinal:  Soft, Non-tender, + BS, obese   Ext: + edema  Neuro: no focal deficits.  Vascular: Peripheral pulses palpable 2+ bilaterally    TELEMETRY: 	    ECG:  	  RADIOLOGY:   DIAGNOSTIC TESTING:  [ ] Echocardiogram:  < from: [facility] w/Doppler (04.05.19 @ 15:18) >  1. Normal left and right ventricular size and systolic function.2. Severe   left   ventricular symmetric hypertrophy.3. Severe right ventricular   hypertrophy.   4. Dilated left and right atria. 5. Mild mitral regurgitation.6. Mild   tricuspid regurgitation.7. Pulmonary hypertension, PASP 40mmHg.8. Small   pericardial effusion without echocardiographic evidence of cardiac   tamponade.    9. Bilateral pleural effusions. 10. The constellation of biatrial   enlargement, marked biventricular hypertrophy, and thickened heart   valves is   consistent with likely infiltrative cardiomyopathy (such as amyloidosis).   Consider further imaging with cardiac MR if clinically indicated.     Referring   notified of findings.    < end of copied text >    [ ]  Catheterization:  [ ] Stress Test:    OTHER: 	    LABS:	 	    CARDIAC MARKERS:                                  15.5   14.46 )-----------( 308      ( 10 May 2019 10:19 )             45.5     05-10    141  |  102  |  19  ----------------------------<  215<H>  3.2<L>   |  25  |  0.91    Ca    9.8      10 May 2019 10:19  Phos  4.6     05-10  Mg     1.7     05-10    TPro  6.1  /  Alb  3.2<L>  /  TBili  1.1  /  DBili  x   /  AST  47<H>  /  ALT  62<H>  /  AlkPhos  100  05-10    proBNP:   Lipid Profile:   HgA1c:   TSH:     ASSESSMENT/PLAN: 	  49yoF with PMH uncompensated dCHF, CVA, NSTEMI, HTN, sickle cell trait, and concern for amyloidosis presented for scheduled abdominal fat pad biopsy complicated by luis manuel/pea arrest? now with ROSC    AL Amyloid  Agree with aggressive diuresis by primary team  Goal 1-2 Liter negative today  Continue ASA and Plavix  Will follow results of abdominal fat pad biopsy

## 2019-05-10 NOTE — PROGRESS NOTE ADULT - SUBJECTIVE AND OBJECTIVE BOX
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 After induction of general anesthesia and intubation (Versed, fentanyl, propofol, succinylcholine, rocuronium), patient experienced bradycardic cardiac arrest/PEA. ROSC achieved after 2 rounds of CPR and 1 dose of epi. In consultation with cardiology, decision was made to proceed with the procedure, which was otherwise uneventful. She was then transferred to the CCU post-operatively on pressor support for further evaluation and management.     Patient seen and examined at bedside. Alert but unable to comply with commands or participate in interview due to temporary effects of paralytic agents. Additional light sedation with Versed and fentanyl was administered to allow for offset of paralytic agents and permit management of her medical conditions.    OBJECTIVE:    VITAL SIGNS:  ICU Vital Signs Last 24 Hrs  T(C): 35.7 (10 May 2019 10:00), Max: 35.7 (10 May 2019 10:00)  T(F): 96.3 (10 May 2019 10:00), Max: 96.3 (10 May 2019 10:00)  HR: 90 (10 May 2019 12:52) (90 - 128)  BP: 94/67 (10 May 2019 12:52) (89/65 - 140/108)  BP(mean): 76 (10 May 2019 12:52) (76 - 120)  ABP: 90/62 (10 May 2019 12:52) (90/62 - 114/76)  ABP(mean): 72 (10 May 2019 12:52) (70 - 92)  RR: 4 (10 May 2019 12:52) (4 - 16)  SpO2: 100% (10 May 2019 12:52) (80% - 100%)    Mode: AC/ CMV (Assist Control/ Continuous Mandatory Ventilation), RR (machine): 12, TV (machine): 400, FiO2: 100, PEEP: 10, ITime: 1, MAP: 15, PIP: 33    05-10 @ 07:01  -  05-10 @ 13:16  --------------------------------------------------------  IN: 17.4 mL / OUT: 382 mL / NET: -364.6 mL      CAPILLARY BLOOD GLUCOSE      POCT Blood Glucose.: 131 mg/dL (10 May 2019 08:17)      PHYSICAL EXAM:    General: Adult Black female, overweight, well-groomed, well-developed, paralyzed and intubated but alert  HEENT: PERRL; mucous membranes moist  Respiratory: clear to auscultation bilaterally; rales throughout; in sync with vent  Cardiovascular: irregularly irregular tachycardia, no murmur appreciable at current rate  Gastrointestinal: soft, non-tender, non-distended  Extremities: warm, well-perfused; 2+ pitting edema to at least knee  Skin: good skin turgor; no rash  Neurological: alert, but unable to comply with commands; non-rhythmic unilateral muscle twitches   Lines/Drains/Tubes: CVL R IJ, PIV x2 L wrist, R antecubital fossa; ETT; John; R radial a line    MEDICATIONS:  MEDICATIONS  (STANDING):  aspirin  chewable 81 milliGRAM(s) Oral daily  BUpivacaine liposome 1.3% Injectable (no eMAR) 20 milliLiter(s) Local Injection once  chlorhexidine 0.12% Liquid 15 milliLiter(s) Oral Mucosa every 12 hours  chlorhexidine 2% Cloths 1 Application(s) Topical <User Schedule>  clopidogrel Tablet 75 milliGRAM(s) Oral daily  fentaNYL   Infusion. 0.5 MICROgram(s)/kG/Hr (3.955 mL/Hr) IV Continuous <Continuous>  heparin  Injectable 5000 Unit(s) SubCutaneous every 8 hours  magnesium sulfate  IVPB 2 Gram(s) IV Intermittent once  midazolam Infusion 0.02 mG/kG/Hr (1.458 mL/Hr) IV Continuous <Continuous>  pantoprazole  Injectable 40 milliGRAM(s) IV Push daily  potassium chloride  20 mEq/100 mL IVPB 20 milliEquivalent(s) IV Intermittent every 2 hours    MEDICATIONS  (PRN):      ALLERGIES:  Allergies    No Known Allergies    Intolerances        LABS:                        15.5   14.46 )-----------( 308      ( 10 May 2019 10:19 )             45.5     05-10    141  |  102  |  19  ----------------------------<  215<H>  3.2<L>   |  25  |  0.91    Ca    9.8      10 May 2019 10:19  Phos  4.6     05-10  Mg     1.7     05-10    TPro  6.1  /  Alb  3.2<L>  /  TBili  1.1  /  DBili  x   /  AST  47<H>  /  ALT  62<H>  /  AlkPhos  100  05-10    LIVER FUNCTIONS - ( 10 May 2019 10:19 )  Alb: 3.2 g/dL / Pro: 6.1 g/dL / ALK PHOS: 100 U/L / ALT: 62 U/L / AST: 47 U/L / GGT: x           PT/INR - ( 10 May 2019 10:19 )   PT: 15.7 sec;   INR: 1.38          PTT - ( 10 May 2019 10:19 )  PTT:23.1 sec          RADIOLOGY & ADDITIONAL TESTS: Reviewed.

## 2019-05-10 NOTE — CONSULT NOTE ADULT - ASSESSMENT
48 yo F with PMH uncompensated dCHF, CVA, NSTEMI, HTN, sickle cell trait, and concern for amyloidosis presents for scheduled abdominal fat pad biopsy to confirm diagnosis of amyloidosis.  After induction of general anesthesia and intubation, patient experienced cardiac arrest/PEA. Chest compressions were started and epinephrine was given. ROSC was achieved.   Patient underwent biopsy and post procedure  was then transferred to the CCU post-operatively, intubated and on an epinephrine gtt. Patient is being diuresed for flush pulmonary edema.  Patient was scheduled to be evaluated by Dr. Holcomb on 5/13/19 as an out patient, at this time EPS will follow up report of biopsy, will continue to monitor telemetry.

## 2019-05-10 NOTE — H&P ADULT - HISTORY OF PRESENT ILLNESS
49yoF with PMH uncompensated dCHF, CVA, NSTEMI, HTN, sickle cell trait, and concern for amyloidosis presents for scheduled abdominal fat pad biopsy to confirm diagnosis of amyloidosis.    After induction of general anesthesia and intubation, patient experienced cardiac arrest/PEA. Chest compressions were started and epinephrine was given. ROSC was achieved. Discussion was had with cardiology and decision was made to proceed with the procedure given the likely underlying etiology of her arrest. Patient remained on pressor support throughout the case. Procedure otherwise uneventful. She was then transferred to the CCU post-operatively, intubated and on an epinephrine gtt.

## 2019-05-10 NOTE — BRIEF OPERATIVE NOTE - OPERATION/FINDINGS
Abdominal fat pad biopsy performed to rule out amyloidosis based on findings on cardiac MRI. Portion of left abdominal wall prefascial tissue excised. Hemostasis achieved and wound closed in multiple layers.    Of note, patient went into cardiac arrest after induction of general anesthesia. Chest compressions were performed and she received epinephrine, with ROSC within five minutes. She remained on neosynephrine and levophed, during the case, and was later switched to epinephrine.

## 2019-05-10 NOTE — PROVIDER CONTACT NOTE (CHANGE IN STATUS NOTIFICATION) - RECOMMENDATIONS
MD Jefferson (night) re informed on continuing low urine output, additional fluid status review requested.

## 2019-05-10 NOTE — H&P ADULT - NSHPPHYSICALEXAM_GEN_ALL_CORE
Gen: NAD, resting comfortably in bed. Well developed, well groomed. Sedated post-operatively  Neuro: Sedated post-operatively  HEENT: PERRL, EOMI, MMM  Pulm: Intubated  C/V: RRR  Abd: Soft, NT/ND. No tympany, no rebound, no guarding. Right abdominal wall incision CDI  Extrem: WWP, no edema, nontender. No cyanosis, pallor. Palpable radial bilaterally

## 2019-05-10 NOTE — H&P ADULT - ASSESSMENT
49yoF with dCHF, hx CVA and NSTEMI, concern for amyloidosis, admitted post-operatively after abdominal fat pad biopsy due to post-induction PEA/cardiac arrest    Admit to Dr Casey's service, CCU  Intubated post-operatively; wean to extubation per CCU  Okay to begin all home meds as appropriate, including ASA/Plavix; defer to CCU  Appreciate care per CCU  Will follow on Team 4C

## 2019-05-10 NOTE — PATIENT PROFILE ADULT - NSPROGENANESREACTIONPT_GEN_A_NUR
Pt when got general anesthesia  went hypotension and PEA arrest. CPR was done pt already intubated ROSC.

## 2019-05-10 NOTE — H&P ADULT - ATTENDING COMMENTS
Patient noted to be in cardiac arrest/PEA after induction of GETA.  No pulse was felt.  Compressions were started, and epinephrine was given by anesthesia.  ROSC returned and she was started on neosynephrine.  A right radial arterial line, a right IJ TLC, and a right 5F IV were placed urgently.  I discussed the event with the patient's cardiologist and hematologist, and it was felt that a diagnosis must be obtained for this patient regarding her cardiac status and the possible amyloidosis.  We therefore proceeded with the planned procedure.  The patient was on pressors during the procedure.  At the termination of the procedure, she was transferred to the CCU intubated and on pressors.  I discussed the events in the operating room with the patient's sister, and explained why the procedure proceeded despite the cardiac arrest after discussion with the cardiologist and hematologist.

## 2019-05-10 NOTE — PROCEDURE NOTE - NSPOSTCAREGUIDE_GEN_A_CORE
Verbal/written post procedure instructions were given to patient/caregiver/Instructed patient/caregiver to follow-up with primary care physician/Keep the cast/splint/dressing clean and dry/Instructed patient/caregiver regarding signs and symptoms of infection

## 2019-05-10 NOTE — PROGRESS NOTE ADULT - SUBJECTIVE AND OBJECTIVE BOX
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 Her recent workup included echo, cardiac mri and evaluation with cardiology who felt that patient was medically optimized to undergo procedure. The patient was brought to or, with standard anesthesia monitoring and was induced with versed, fent, prop/hallie. Soon after induction of anesthesia, pt was noted to have decreased etC02 with concurrent cycling of bp with mean of 25, and pulse could not be attained. CPR was initiated immediately, code team called and 1 mg of epi administered. Pt was noted to be in PEA arrest at the time. After 2 rounds of cpr, she regained a pulse and bp was in systolic of 90/60. ABGs were sent and vitals signs were maintained on 1005 fio2 and vasopressor support with epinephrine drip at 0.05mcg/kg/min. Due to her HD stability on epi and needed for an official diagnosis with the abdominal fat pad biopsy after discussion with cardiology, surgery was continued and pt was taken to ICU .

## 2019-05-10 NOTE — H&P ADULT - NSICDXPASTMEDICALHX_GEN_ALL_CORE_FT
PAST MEDICAL HISTORY:  Cerebrospinal fluid leak     CVA (cerebral vascular accident) embolic stroke 1/2019 L side with R side weakness    Heart failure     HTN (hypertension)     MI (myocardial infarction)

## 2019-05-10 NOTE — H&P ADULT - NSICDXPASTSURGICALHX_GEN_ALL_CORE_FT
PAST SURGICAL HISTORY:  Adenomatous polyp of colon polyp removed no cancer of colon    History of surgery dilation and evacuation    Ventriculomegaly of brain, congenital

## 2019-05-10 NOTE — PROGRESS NOTE ADULT - ASSESSMENT
49yoF with dCHF, hx CVA and NSTEMI, concern for amyloidosis, admitted post-operatively after abdominal fat pad biopsy due to post-induction bradycardic PEA arrest associated with flash pulmonary edema, likely a consequence of induced bradycardia in the setting of general anesthesia, now with mechanically vented and sedated; time down was 5 minutes with patient receiving CPR per ACLS protocol until ROSC achieved after 2 rounds, 1x dose epi. Pressor support was with Levophed, then epi drip, which was discontinued after patient arrived at CCU. She remains mechanically ventilated and sedated while she is diuresed and medically optimized for extubation.     # 49yoF with dCHF, hx CVA and NSTEMI, concern for amyloidosis, admitted post-operatively after abdominal fat pad biopsy due to post-induction bradycardic PEA arrest associated with flash pulmonary edema, likely a consequence of induced bradycardia in the setting of general anesthesia, now with mechanically vented and sedated; time down was 5 minutes with patient receiving CPR per ACLS protocol until ROSC achieved after 2 rounds, 1x dose epi. Pressor support was with Levophed, then epi drip, which was discontinued after patient arrived at CCU. She remains mechanically ventilated and sedated while she is diuresed and medically optimized for extubation.     NEURO  - neuro appears intact based on admission exam  - sedation to RASS -3 for now to permit medical management    CARDIOVASCULAR  # Restrictive cardiomyopathy, status post luis manuel PEA arrest   - diuresis with Lasix PRN for flash pulmonary edema  - CXR in AM   - strict I/O  - John for UOP monitoring in critical illness  - daily weights    PULMONARY  - continue AC/CMV for now  - wean O2 requirements as tolerated  - ABG q4h   - CPAP trial in AM, will extubate if tolerated    RENAL  - admission Cr 0.9   - trend BMP routinely    HEME  # Polycythemia, s/p TIA, s/p NSTEMI  - ASA  - Plavix  - initiating atorvastatin 40 mg qhs     FLUIDS/ELECTROLYTES/NUTRITION  -IVF: caution in restrictive cardiomyopathy  -Monitor, Replete to K>4 and Mg>2  -Diet: NPO, meds via NGT  PROPHYLAXIS  -DVT: HSQ, SCDs  -GI: PPI qd while on DAPT    DISPO: CCU  CODE STATUS: FULL CODE

## 2019-05-10 NOTE — H&P ADULT - NSICDXFAMILYHX_GEN_ALL_CORE_FT
FAMILY HISTORY:  Mother  Still living? No  Family history of cerebrovascular accident (CVA), Age at diagnosis: Age Unknown

## 2019-05-11 DIAGNOSIS — Z91.89 OTHER SPECIFIED PERSONAL RISK FACTORS, NOT ELSEWHERE CLASSIFIED: ICD-10-CM

## 2019-05-11 LAB
ALBUMIN SERPL ELPH-MCNC: 3.2 G/DL — LOW (ref 3.3–5)
ALP SERPL-CCNC: 85 U/L — SIGNIFICANT CHANGE UP (ref 40–120)
ALT FLD-CCNC: 53 U/L — HIGH (ref 10–45)
ANION GAP SERPL CALC-SCNC: 12 MMOL/L — SIGNIFICANT CHANGE UP (ref 5–17)
ANION GAP SERPL CALC-SCNC: 14 MMOL/L — SIGNIFICANT CHANGE UP (ref 5–17)
ANION GAP SERPL CALC-SCNC: 15 MMOL/L — SIGNIFICANT CHANGE UP (ref 5–17)
APTT BLD: 24.3 SEC — LOW (ref 27.5–36.3)
AST SERPL-CCNC: 41 U/L — HIGH (ref 10–40)
BASE EXCESS BLDA CALC-SCNC: 1 MMOL/L — SIGNIFICANT CHANGE UP (ref -2–3)
BASE EXCESS BLDA CALC-SCNC: 2.4 MMOL/L — SIGNIFICANT CHANGE UP (ref -2–3)
BASE EXCESS BLDA CALC-SCNC: 2.8 MMOL/L — SIGNIFICANT CHANGE UP (ref -2–3)
BASE EXCESS BLDA CALC-SCNC: 3.2 MMOL/L — HIGH (ref -2–3)
BASOPHILS # BLD AUTO: 0 K/UL — SIGNIFICANT CHANGE UP (ref 0–0.2)
BASOPHILS NFR BLD AUTO: 0 % — SIGNIFICANT CHANGE UP (ref 0–2)
BILIRUB SERPL-MCNC: 1.1 MG/DL — SIGNIFICANT CHANGE UP (ref 0.2–1.2)
BUN SERPL-MCNC: 23 MG/DL — SIGNIFICANT CHANGE UP (ref 7–23)
BUN SERPL-MCNC: 24 MG/DL — HIGH (ref 7–23)
BUN SERPL-MCNC: 27 MG/DL — HIGH (ref 7–23)
CALCIUM SERPL-MCNC: 9.4 MG/DL — SIGNIFICANT CHANGE UP (ref 8.4–10.5)
CALCIUM SERPL-MCNC: 9.7 MG/DL — SIGNIFICANT CHANGE UP (ref 8.4–10.5)
CALCIUM SERPL-MCNC: 9.9 MG/DL — SIGNIFICANT CHANGE UP (ref 8.4–10.5)
CHLORIDE SERPL-SCNC: 102 MMOL/L — SIGNIFICANT CHANGE UP (ref 96–108)
CHLORIDE SERPL-SCNC: 103 MMOL/L — SIGNIFICANT CHANGE UP (ref 96–108)
CHLORIDE SERPL-SCNC: 104 MMOL/L — SIGNIFICANT CHANGE UP (ref 96–108)
CO2 SERPL-SCNC: 22 MMOL/L — SIGNIFICANT CHANGE UP (ref 22–31)
CO2 SERPL-SCNC: 23 MMOL/L — SIGNIFICANT CHANGE UP (ref 22–31)
CO2 SERPL-SCNC: 27 MMOL/L — SIGNIFICANT CHANGE UP (ref 22–31)
CREAT SERPL-MCNC: 1.32 MG/DL — HIGH (ref 0.5–1.3)
CREAT SERPL-MCNC: 1.34 MG/DL — HIGH (ref 0.5–1.3)
CREAT SERPL-MCNC: 1.36 MG/DL — HIGH (ref 0.5–1.3)
EOSINOPHIL # BLD AUTO: 0 K/UL — SIGNIFICANT CHANGE UP (ref 0–0.5)
EOSINOPHIL NFR BLD AUTO: 0 % — SIGNIFICANT CHANGE UP (ref 0–6)
GAS PNL BLDA: SIGNIFICANT CHANGE UP
GLUCOSE SERPL-MCNC: 116 MG/DL — HIGH (ref 70–99)
GLUCOSE SERPL-MCNC: 136 MG/DL — HIGH (ref 70–99)
GLUCOSE SERPL-MCNC: 142 MG/DL — HIGH (ref 70–99)
HCO3 BLDA-SCNC: 25 MMOL/L — SIGNIFICANT CHANGE UP (ref 21–28)
HCO3 BLDA-SCNC: 27 MMOL/L — SIGNIFICANT CHANGE UP (ref 21–28)
HCT VFR BLD CALC: 43.2 % — SIGNIFICANT CHANGE UP (ref 34.5–45)
HGB BLD-MCNC: 14.8 G/DL — SIGNIFICANT CHANGE UP (ref 11.5–15.5)
INR BLD: 1.35 — HIGH (ref 0.88–1.16)
LACTATE SERPL-SCNC: 2.1 MMOL/L — HIGH (ref 0.5–2)
LACTATE SERPL-SCNC: 2.3 MMOL/L — HIGH (ref 0.5–2)
LACTATE SERPL-SCNC: 2.4 MMOL/L — HIGH (ref 0.5–2)
LACTATE SERPL-SCNC: 3.3 MMOL/L — HIGH (ref 0.5–2)
LYMPHOCYTES # BLD AUTO: 1.08 K/UL — SIGNIFICANT CHANGE UP (ref 1–3.3)
LYMPHOCYTES # BLD AUTO: 5.2 % — LOW (ref 13–44)
MAGNESIUM SERPL-MCNC: 1.9 MG/DL — SIGNIFICANT CHANGE UP (ref 1.6–2.6)
MAGNESIUM SERPL-MCNC: 1.9 MG/DL — SIGNIFICANT CHANGE UP (ref 1.6–2.6)
MAGNESIUM SERPL-MCNC: 2.1 MG/DL — SIGNIFICANT CHANGE UP (ref 1.6–2.6)
MCHC RBC-ENTMCNC: 27.8 PG — SIGNIFICANT CHANGE UP (ref 27–34)
MCHC RBC-ENTMCNC: 34.3 GM/DL — SIGNIFICANT CHANGE UP (ref 32–36)
MCV RBC AUTO: 81.1 FL — SIGNIFICANT CHANGE UP (ref 80–100)
MONOCYTES # BLD AUTO: 1.46 K/UL — HIGH (ref 0–0.9)
MONOCYTES NFR BLD AUTO: 7 % — SIGNIFICANT CHANGE UP (ref 2–14)
NEUTROPHILS # BLD AUTO: 18.31 K/UL — HIGH (ref 1.8–7.4)
NEUTROPHILS NFR BLD AUTO: 87.8 % — HIGH (ref 43–77)
PCO2 BLDA: 38 MMHG — SIGNIFICANT CHANGE UP (ref 32–45)
PCO2 BLDA: 39 MMHG — SIGNIFICANT CHANGE UP (ref 32–45)
PCO2 BLDA: 39 MMHG — SIGNIFICANT CHANGE UP (ref 32–45)
PCO2 BLDA: 42 MMHG — SIGNIFICANT CHANGE UP (ref 32–45)
PH BLDA: 7.43 — SIGNIFICANT CHANGE UP (ref 7.35–7.45)
PH BLDA: 7.44 — SIGNIFICANT CHANGE UP (ref 7.35–7.45)
PH BLDA: 7.46 — HIGH (ref 7.35–7.45)
PH BLDA: 7.46 — HIGH (ref 7.35–7.45)
PHOSPHATE SERPL-MCNC: 5.4 MG/DL — HIGH (ref 2.5–4.5)
PHOSPHATE SERPL-MCNC: 5.6 MG/DL — HIGH (ref 2.5–4.5)
PLATELET # BLD AUTO: 286 K/UL — SIGNIFICANT CHANGE UP (ref 150–400)
PO2 BLDA: 104 MMHG — SIGNIFICANT CHANGE UP (ref 83–108)
PO2 BLDA: 65 MMHG — LOW (ref 83–108)
PO2 BLDA: 83 MMHG — SIGNIFICANT CHANGE UP (ref 83–108)
PO2 BLDA: 96 MMHG — SIGNIFICANT CHANGE UP (ref 83–108)
POTASSIUM SERPL-MCNC: 3.4 MMOL/L — LOW (ref 3.5–5.3)
POTASSIUM SERPL-MCNC: 4.4 MMOL/L — SIGNIFICANT CHANGE UP (ref 3.5–5.3)
POTASSIUM SERPL-MCNC: 4.6 MMOL/L — SIGNIFICANT CHANGE UP (ref 3.5–5.3)
POTASSIUM SERPL-SCNC: 3.4 MMOL/L — LOW (ref 3.5–5.3)
POTASSIUM SERPL-SCNC: 4.4 MMOL/L — SIGNIFICANT CHANGE UP (ref 3.5–5.3)
POTASSIUM SERPL-SCNC: 4.6 MMOL/L — SIGNIFICANT CHANGE UP (ref 3.5–5.3)
PROT SERPL-MCNC: 5.9 G/DL — LOW (ref 6–8.3)
PROTHROM AB SERPL-ACNC: 15.4 SEC — HIGH (ref 10–12.9)
RBC # BLD: 5.33 M/UL — HIGH (ref 3.8–5.2)
RBC # FLD: 14.7 % — HIGH (ref 10.3–14.5)
SAO2 % BLDA: 94 % — LOW (ref 95–100)
SAO2 % BLDA: 96 % — SIGNIFICANT CHANGE UP (ref 95–100)
SAO2 % BLDA: 97 % — SIGNIFICANT CHANGE UP (ref 95–100)
SAO2 % BLDA: 98 % — SIGNIFICANT CHANGE UP (ref 95–100)
SODIUM SERPL-SCNC: 139 MMOL/L — SIGNIFICANT CHANGE UP (ref 135–145)
SODIUM SERPL-SCNC: 140 MMOL/L — SIGNIFICANT CHANGE UP (ref 135–145)
SODIUM SERPL-SCNC: 143 MMOL/L — SIGNIFICANT CHANGE UP (ref 135–145)
WBC # BLD: 20.85 K/UL — HIGH (ref 3.8–10.5)
WBC # FLD AUTO: 20.85 K/UL — HIGH (ref 3.8–10.5)

## 2019-05-11 PROCEDURE — 99291 CRITICAL CARE FIRST HOUR: CPT

## 2019-05-11 PROCEDURE — 71045 X-RAY EXAM CHEST 1 VIEW: CPT | Mod: 26

## 2019-05-11 RX ORDER — BUMETANIDE 0.25 MG/ML
0.5 INJECTION INTRAMUSCULAR; INTRAVENOUS
Qty: 20 | Refills: 0 | Status: DISCONTINUED | OUTPATIENT
Start: 2019-05-11 | End: 2019-05-13

## 2019-05-11 RX ORDER — BUMETANIDE 0.25 MG/ML
2 INJECTION INTRAMUSCULAR; INTRAVENOUS ONCE
Refills: 0 | Status: COMPLETED | OUTPATIENT
Start: 2019-05-11 | End: 2019-05-11

## 2019-05-11 RX ORDER — MAGNESIUM SULFATE 500 MG/ML
1 VIAL (ML) INJECTION ONCE
Refills: 0 | Status: COMPLETED | OUTPATIENT
Start: 2019-05-11 | End: 2019-05-11

## 2019-05-11 RX ORDER — POTASSIUM CHLORIDE 20 MEQ
20 PACKET (EA) ORAL
Refills: 0 | Status: COMPLETED | OUTPATIENT
Start: 2019-05-11 | End: 2019-05-11

## 2019-05-11 RX ADMIN — CHLORHEXIDINE GLUCONATE 1 APPLICATION(S): 213 SOLUTION TOPICAL at 06:23

## 2019-05-11 RX ADMIN — Medication 81 MILLIGRAM(S): at 18:34

## 2019-05-11 RX ADMIN — HEPARIN SODIUM 5000 UNIT(S): 5000 INJECTION INTRAVENOUS; SUBCUTANEOUS at 15:00

## 2019-05-11 RX ADMIN — BUMETANIDE 2 MILLIGRAM(S): 0.25 INJECTION INTRAMUSCULAR; INTRAVENOUS at 02:17

## 2019-05-11 RX ADMIN — CHLORHEXIDINE GLUCONATE 15 MILLILITER(S): 213 SOLUTION TOPICAL at 06:23

## 2019-05-11 RX ADMIN — Medication 200 GRAM(S): at 07:07

## 2019-05-11 RX ADMIN — CHLORHEXIDINE GLUCONATE 15 MILLILITER(S): 213 SOLUTION TOPICAL at 18:34

## 2019-05-11 RX ADMIN — Medication 100 GRAM(S): at 15:53

## 2019-05-11 RX ADMIN — CLOPIDOGREL BISULFATE 75 MILLIGRAM(S): 75 TABLET, FILM COATED ORAL at 18:34

## 2019-05-11 RX ADMIN — BUMETANIDE 1 MG/HR: 0.25 INJECTION INTRAMUSCULAR; INTRAVENOUS at 02:54

## 2019-05-11 RX ADMIN — Medication 50 MILLIEQUIVALENT(S): at 18:34

## 2019-05-11 RX ADMIN — HEPARIN SODIUM 5000 UNIT(S): 5000 INJECTION INTRAVENOUS; SUBCUTANEOUS at 06:23

## 2019-05-11 RX ADMIN — PANTOPRAZOLE SODIUM 40 MILLIGRAM(S): 20 TABLET, DELAYED RELEASE ORAL at 12:22

## 2019-05-11 RX ADMIN — HEPARIN SODIUM 5000 UNIT(S): 5000 INJECTION INTRAVENOUS; SUBCUTANEOUS at 22:32

## 2019-05-11 RX ADMIN — Medication 50 MILLIEQUIVALENT(S): at 16:28

## 2019-05-11 RX ADMIN — BUMETANIDE 5 MG/HR: 0.25 INJECTION INTRAMUSCULAR; INTRAVENOUS at 20:00

## 2019-05-11 NOTE — PROGRESS NOTE ADULT - ASSESSMENT
49yoF with dCHF, hx CVA and NSTEMI, unspecified amyloidosis, admitted post-operatively after abdominal fat pad biopsy due to post-induction bradycardic PEA arrest associated with flash pulmonary edema, with ROSC, now extubated and off pressors, mentating.     Advance diet per primary team  General surgery will sign off at this time.  Follow up with Dr. Richardson in office.    Discussed with Dr. Richardson

## 2019-05-11 NOTE — PROGRESS NOTE ADULT - ASSESSMENT
THIS NOTE AND PLAN ARE INCOMPLETE, PENDING DISCUSSION OF PLAN WITH ATTENDING ON ROUNDS. 49yoF with dCHF, hx CVA and NSTEMI, unspecified amyloidosis, admitted post-operatively after abdominal fat pad biopsy due to post-induction bradycardic PEA arrest associated with flash pulmonary edema, likely a consequence of induced bradycardia in the setting of general anesthesia, now with mechanically vented and sedated; time down was 5 minutes with patient receiving CPR per ACLS protocol until ROSC achieved after 2 rounds of compressions, 1x dose epi. Pressor support was with Levophed, then epi drip, which was discontinued after patient arrived at CCU. Now successfully extubated, mentating at baseline.    NEURO  - AOx3    CARDIOVASCULAR  # Restrictive cardiomyopathy, status post luis manuel PEA arrest, secondary to unspecified amyloidosis  - diuresis with Lasix PRN for flash pulmonary edema  - CXR in AM   - strict I/O  - John for UOP monitoring in critical illness  - daily weights    PULMONARY  - wean NC O2 requirements as tolerated  - will repeat ABG s/p extubation    RENAL  # JOSE, likely ATN secondary to hypoperfusion during cardiac arrest  Admission Cr 0.9, with peak Cr 1.34; has now plateaued at 1.32  - continue to trend BMP, lytes    HEME  # Polycythemia, s/p TIA, s/p NSTEMI  - ASA  - Plavix  - atorvastatin 40 mg qhs     FLUIDS/ELECTROLYTES/NUTRITION  -IVF: caution in restrictive cardiomyopathy  -Monitor, Replete to K>4 and Mg>2  -Diet: Soft diet DASH/TLC after passing dysphagia screen    PROPHYLAXIS  -DVT: HSQ, SCDs  -GI: PPI qd while on DAPT    DISPO: CCU  CODE STATUS: FULL CODE 49yoF with dCHF, hx CVA and NSTEMI, unspecified amyloidosis, admitted post-operatively after abdominal fat pad biopsy due to post-induction bradycardic PEA arrest associated with flash pulmonary edema, likely a consequence of induced bradycardia in the setting of general anesthesia, now with mechanically vented and sedated; time down was 5 minutes with patient receiving CPR per ACLS protocol until ROSC achieved after 2 rounds of compressions, 1x dose epi. Pressor support was with Levophed, then epi drip, which was discontinued after patient arrived at CCU. Now successfully extubated, mentating at baseline.    NEURO  - AOx3    CARDIOVASCULAR  # Restrictive cardiomyopathy, status post luis manuel PEA arrest, secondary to unspecified amyloidosis. Did not respond adequately to Lasix and metolazone; now making good urine on Bumex drip  - continue diuresis with Bumex drip  - daily CXR, strict I/O, daily weights  - Alvaro for UOP monitoring in critical illness  - trend lactate to monitor perfusion status  - repeat TTE in 1-2 days to reassess EF    PULMONARY  - wean NC O2 requirements as tolerated  - will repeat ABG s/p extubation    RENAL  # JOSE, likely ATN secondary to hypoperfusion during cardiac arrest  Admission Cr 0.9, with peak Cr 1.34. Briefly oliguric, but now with good output on Bumex. Cr now plateaued at 1.32  - continue to trend BMP, lytes and monitor carefully during diuresis  - avoid nephrotoxic meds    HEME/ONC  # Plasma cell dyscrasia, amyloidosis, polycythemia, s/p TIA, s/p NSTEMI  - ASA  - Plavix  - atorvastatin 40 mg qhs     FLUIDS/ELECTROLYTES/NUTRITION  -IVF: caution in restrictive cardiomyopathy  -Monitor, Replete to K>4 and Mg>2  -Diet: Soft diet DASH/TLC after passing dysphagia screen    PROPHYLAXIS  -DVT: HSQ, SCDs  -GI: PPI qd while on DAPT    DISPO: CCU  CODE STATUS: FULL CODE

## 2019-05-11 NOTE — PROGRESS NOTE ADULT - SUBJECTIVE AND OBJECTIVE BOX
STATUS POST:  Abdominal wall biopsy      POST OPERATIVE DAY #: 1    SUBJECTIVE:   Extubated, off pressors  Patient  pain controlled, denies shortness of breath.  Currently NPO. Denies nausea/vomiting  -Flatus    ---------------------------------------------------------------    Vital Signs Last 24 Hrs  T(C): 37.4 (11 May 2019 09:43), Max: 37.4 (10 May 2019 18:00)  T(F): 99.3 (11 May 2019 09:43), Max: 99.4 (10 May 2019 18:00)  HR: 104 (11 May 2019 16:00) (90 - 110)  BP: 90/66 (11 May 2019 14:00) (72/59 - 90/66)  BP(mean): 73 (11 May 2019 14:00) (64 - 73)  RR: 20 (11 May 2019 16:00) (8 - 28)  SpO2: 94% (11 May 2019 16:00) (87% - 100%)    I&O's Detail    10 May 2019 07:01  -  11 May 2019 07:00  --------------------------------------------------------  IN:    bumetanide Infusion: 2 mL    bumetanide Infusion: 15 mL    Enteral Tube Flush: 60 mL    fentaNYL Infusion.: 64.8 mL    fentaNYL Infusion.: 11.1 mL    IV PiggyBack: 450 mL    midazolam Infusion: 11.9 mL    midazolam Infusion: 65.2 mL    norepinephrine Infusion: 110 mL  Total IN: 790 mL    OUT:    Indwelling Catheter - Urethral: 550 mL    Voided: 817 mL  Total OUT: 1367 mL    Total NET: -577 mL      11 May 2019 07:01  -  11 May 2019 16:22  --------------------------------------------------------  IN:    bumetanide Infusion: 45 mL    fentaNYL Infusion.: 4 mL    IV PiggyBack: 100 mL    midazolam Infusion: 5 mL    norepinephrine Infusion: 10 mL  Total IN: 164 mL    OUT:    Indwelling Catheter - Urethral: 1400 mL  Total OUT: 1400 mL    Total NET: -1236 mL          ----------------------------------------------------------------    Physical Exam:  General: NAD, Comfortable in bed     Neuro: A&Ox3  Respiratory: nonlabored breathing, no respiratory distress  with facemask   Abd: soft, nontender, nondistended,  incisions clean dry intact, without erythema, drainage, or malodor   : sloan in place with yellow urine  Extremities: WWP, nonedematous, SCDs in place    -------------------------------------------------------------------    LABS:                        14.8   20.85 )-----------( 286      ( 11 May 2019 05:43 )             43.2     05-11    143  |  104  |  27<H>  ----------------------------<  116<H>  3.4<L>   |  27  |  1.32<H>    Ca    9.4      11 May 2019 15:08  Phos  5.6     05-11  Mg     1.9     05-11    TPro  5.9<L>  /  Alb  3.2<L>  /  TBili  1.1  /  DBili  x   /  AST  41<H>  /  ALT  53<H>  /  AlkPhos  85  05-11    PT/INR - ( 11 May 2019 05:43 )   PT: 15.4 sec;   INR: 1.35          PTT - ( 11 May 2019 05:43 )  PTT:24.3 sec        RADIOLOGY & ADDITIONAL STUDIES:

## 2019-05-11 NOTE — PROGRESS NOTE ADULT - SUBJECTIVE AND OBJECTIVE BOX
INTERVAL HPI/OVERNIGHT EVENTS: Evening labs showed, lactate trending down to 2.8; potassium 6.1, so dextrose and insulin were given to correct hyperkalemia. Urinary output was decreased so patient was given Lasix x1 and metolazone x1, with minimal response, then bolused with Bumex 2 mg push and started on Bumex drip @ 1, after which urine output picked up. Repeat potassium wnl however repeat lactate 3.3. CXR notable for worsening pulmonary vascular congestion.     SUBJECTIVE:  Patient alert. She denies pain. She nods when asked if she would like to be extubated. Unable to participate in full ROS due to clinical condition.    OBJECTIVE:    VITAL SIGNS:  ICU Vital Signs Last 24 Hrs  T(C): 37.4 (11 May 2019 09:43), Max: 37.4 (10 May 2019 18:00)  T(F): 99.3 (11 May 2019 09:43), Max: 99.4 (10 May 2019 18:00)  HR: 100 (11 May 2019 09:43) (86 - 128)  BP: 75/57 (10 May 2019 21:02) (72/59 - 140/108)  BP(mean): 64 (10 May 2019 21:02) (62 - 120)  ABP: 112/68 (11 May 2019 09:43) (82/60 - 114/76)  ABP(mean): 80 (11 May 2019 09:43) (68 - 92)  RR: 15 (11 May 2019 09:43) (4 - 28)  SpO2: 94% (11 May 2019 09:43) (80% - 100%)    Mode: AC/ CMV (Assist Control/ Continuous Mandatory Ventilation), RR (machine): 12, TV (machine): 400, FiO2: 50, PEEP: 8, ITime: 1, MAP: 13, PIP: 33    05-10 @ 07:01  -  05-11 @ 07:00  --------------------------------------------------------  IN: 790 mL / OUT: 1367 mL / NET: -577 mL    05-11 @ 07:01  -  05-11 @ 09:56  --------------------------------------------------------  IN: 29 mL / OUT: 350 mL / NET: -321 mL      CAPILLARY BLOOD GLUCOSE      POCT Blood Glucose.: 129 mg/dL (10 May 2019 22:23)      PHYSICAL EXAM:    *******TEMPLATED LANGUAGE, TO BE UPDATED WITH SPECIFIC PHYSICAL EXAM FINDINGS**************  General: Adult ********* well-developed, well-nourished, in no acute distress  HEENT: PERRL; mucous membranes moist  Neck: no jugular venous distention  Respiratory: clear to auscultation bilaterally; no wheezes, rales or rhonchi  Cardiovascular: regular rate and rhythm; no murmur, rub or gallop  Gastrointestinal: soft, non-tender, non-distended  Extremities: warm, well-perfused; ****** edema  Skin: good skin turgor; no rash; no large scars; no visible tattoos ******  Neurological: ****** alert, following commands, moving extremities x4 to command  Lines/Drains/Tubes: *************    MEDICATIONS:  MEDICATIONS  (STANDING):  aspirin  chewable 81 milliGRAM(s) Oral daily  buMETAnide Infusion 1 mG/Hr (5 mL/Hr) IV Continuous <Continuous>  chlorhexidine 0.12% Liquid 15 milliLiter(s) Oral Mucosa every 12 hours  chlorhexidine 2% Cloths 1 Application(s) Topical <User Schedule>  clopidogrel Tablet 75 milliGRAM(s) Oral daily  fentaNYL   Infusion. 0.5 MICROgram(s)/kG/Hr (3.955 mL/Hr) IV Continuous <Continuous>  heparin  Injectable 5000 Unit(s) SubCutaneous every 8 hours  midazolam Infusion 0.018 mG/kG/Hr (1.458 mL/Hr) IV Continuous <Continuous>  pantoprazole  Injectable 40 milliGRAM(s) IV Push daily    MEDICATIONS  (PRN):      ALLERGIES:  Allergies    No Known Allergies    Intolerances        LABS:                        14.8   20.85 )-----------( 286      ( 11 May 2019 05:43 )             43.2     05-11    140  |  102  |  24<H>  ----------------------------<  142<H>  4.4   |  23  |  1.34<H>    Ca    9.9      11 May 2019 05:43  Phos  5.4     05-11  Mg     1.9     05-11    TPro  5.9<L>  /  Alb  3.2<L>  /  TBili  1.1  /  DBili  x   /  AST  41<H>  /  ALT  53<H>  /  AlkPhos  85  05-11    LIVER FUNCTIONS - ( 11 May 2019 05:43 )  Alb: 3.2 g/dL / Pro: 5.9 g/dL / ALK PHOS: 85 U/L / ALT: 53 U/L / AST: 41 U/L / GGT: x           PT/INR - ( 11 May 2019 05:43 )   PT: 15.4 sec;   INR: 1.35          PTT - ( 11 May 2019 05:43 )  PTT:24.3 sec          RADIOLOGY & ADDITIONAL TESTS: Reviewed. INTERVAL HPI/OVERNIGHT EVENTS: Evening labs showed, lactate trending down to 2.8; potassium 6.1, so dextrose and insulin were given to correct hyperkalemia. Urinary output was decreased so patient was given Lasix x1 and metolazone x1, with minimal response, then bolused with Bumex 2 mg push and started on Bumex drip @ 1, after which urine output picked up. Repeat potassium wnl however repeat lactate 3.3; with adequate diuresis, lactate trended back down to 2.4 in the AM.    SUBJECTIVE:  Patient alert. She denies pain. She nods when asked if she would like to be extubated. Unable to participate in full ROS due to clinical condition.    OBJECTIVE:    VITAL SIGNS:  ICU Vital Signs Last 24 Hrs  T(C): 37.4 (11 May 2019 09:43), Max: 37.4 (10 May 2019 18:00)  T(F): 99.3 (11 May 2019 09:43), Max: 99.4 (10 May 2019 18:00)  HR: 100 (11 May 2019 09:43) (86 - 128)  BP: 75/57 (10 May 2019 21:02) (72/59 - 140/108)  BP(mean): 64 (10 May 2019 21:02) (62 - 120)  ABP: 112/68 (11 May 2019 09:43) (82/60 - 114/76)  ABP(mean): 80 (11 May 2019 09:43) (68 - 92)  RR: 15 (11 May 2019 09:43) (4 - 28)  SpO2: 94% (11 May 2019 09:43) (80% - 100%)    Mode: AC/ CMV (Assist Control/ Continuous Mandatory Ventilation), RR (machine): 12, TV (machine): 400, FiO2: 50, PEEP: 8, ITime: 1, MAP: 13, PIP: 33    05-10 @ 07:01  -  05-11 @ 07:00  --------------------------------------------------------  IN: 790 mL / OUT: 1367 mL / NET: -577 mL    05-11 @ 07:01  -  05-11 @ 09:56  --------------------------------------------------------  IN: 29 mL / OUT: 350 mL / NET: -321 mL      CAPILLARY BLOOD GLUCOSE      POCT Blood Glucose.: 129 mg/dL (10 May 2019 22:23)      PHYSICAL EXAM:    General: Adult Black female, overweight, well-groomed, well-developed, intubated but alert and oriented  HEENT: PERRL; mucous membranes moist  Respiratory: clear to auscultation bilaterally; rales at bases; in sync with vent  Cardiovascular: regular rate and rhythm, no murmur appreciated  Gastrointestinal: soft, non-tender, non-distended  Extremities: warm, well-perfused; 1+ pitting edema to at least knee  Skin: good skin turgor; no rash  Neurological: alert, following commands, writing coherently on paper using a ballpoint pen quite well considering bilateral wrist restraints in place, requesting food, water and Ensure; moving 4x extremities spontaneously  Lines/Drains/Tubes: CVL R IJ, PIV L wrist, PIV R antecubital fossa; ETT; NGT; John; R radial a line    MEDICATIONS:  MEDICATIONS  (STANDING):  aspirin  chewable 81 milliGRAM(s) Oral daily  buMETAnide Infusion 1 mG/Hr (5 mL/Hr) IV Continuous <Continuous>  chlorhexidine 0.12% Liquid 15 milliLiter(s) Oral Mucosa every 12 hours  chlorhexidine 2% Cloths 1 Application(s) Topical <User Schedule>  clopidogrel Tablet 75 milliGRAM(s) Oral daily  fentaNYL   Infusion. 0.5 MICROgram(s)/kG/Hr (3.955 mL/Hr) IV Continuous <Continuous>  heparin  Injectable 5000 Unit(s) SubCutaneous every 8 hours  midazolam Infusion 0.018 mG/kG/Hr (1.458 mL/Hr) IV Continuous <Continuous>  pantoprazole  Injectable 40 milliGRAM(s) IV Push daily    MEDICATIONS  (PRN):      ALLERGIES:  Allergies    No Known Allergies    Intolerances        LABS:                        14.8   20.85 )-----------( 286      ( 11 May 2019 05:43 )             43.2     05-11    140  |  102  |  24<H>  ----------------------------<  142<H>  4.4   |  23  |  1.34<H>    Ca    9.9      11 May 2019 05:43  Phos  5.4     05-11  Mg     1.9     05-11    TPro  5.9<L>  /  Alb  3.2<L>  /  TBili  1.1  /  DBili  x   /  AST  41<H>  /  ALT  53<H>  /  AlkPhos  85  05-11    LIVER FUNCTIONS - ( 11 May 2019 05:43 )  Alb: 3.2 g/dL / Pro: 5.9 g/dL / ALK PHOS: 85 U/L / ALT: 53 U/L / AST: 41 U/L / GGT: x           PT/INR - ( 11 May 2019 05:43 )   PT: 15.4 sec;   INR: 1.35          PTT - ( 11 May 2019 05:43 )  PTT:24.3 sec          RADIOLOGY & ADDITIONAL TESTS: Reviewed.

## 2019-05-12 DIAGNOSIS — I42.5 OTHER RESTRICTIVE CARDIOMYOPATHY: ICD-10-CM

## 2019-05-12 LAB
ANION GAP SERPL CALC-SCNC: 11 MMOL/L — SIGNIFICANT CHANGE UP (ref 5–17)
ANION GAP SERPL CALC-SCNC: 12 MMOL/L — SIGNIFICANT CHANGE UP (ref 5–17)
ANION GAP SERPL CALC-SCNC: 14 MMOL/L — SIGNIFICANT CHANGE UP (ref 5–17)
ANION GAP SERPL CALC-SCNC: 14 MMOL/L — SIGNIFICANT CHANGE UP (ref 5–17)
BASE EXCESS BLDA CALC-SCNC: 4.2 MMOL/L — HIGH (ref -2–3)
BASE EXCESS BLDA CALC-SCNC: 5.6 MMOL/L — HIGH (ref -2–3)
BUN SERPL-MCNC: 25 MG/DL — HIGH (ref 7–23)
BUN SERPL-MCNC: 25 MG/DL — HIGH (ref 7–23)
BUN SERPL-MCNC: 26 MG/DL — HIGH (ref 7–23)
BUN SERPL-MCNC: 26 MG/DL — HIGH (ref 7–23)
CALCIUM SERPL-MCNC: 9.2 MG/DL — SIGNIFICANT CHANGE UP (ref 8.4–10.5)
CALCIUM SERPL-MCNC: 9.3 MG/DL — SIGNIFICANT CHANGE UP (ref 8.4–10.5)
CALCIUM SERPL-MCNC: 9.4 MG/DL — SIGNIFICANT CHANGE UP (ref 8.4–10.5)
CALCIUM SERPL-MCNC: 9.6 MG/DL — SIGNIFICANT CHANGE UP (ref 8.4–10.5)
CHLORIDE SERPL-SCNC: 100 MMOL/L — SIGNIFICANT CHANGE UP (ref 96–108)
CHLORIDE SERPL-SCNC: 100 MMOL/L — SIGNIFICANT CHANGE UP (ref 96–108)
CHLORIDE SERPL-SCNC: 101 MMOL/L — SIGNIFICANT CHANGE UP (ref 96–108)
CHLORIDE SERPL-SCNC: 101 MMOL/L — SIGNIFICANT CHANGE UP (ref 96–108)
CO2 SERPL-SCNC: 28 MMOL/L — SIGNIFICANT CHANGE UP (ref 22–31)
CO2 SERPL-SCNC: 29 MMOL/L — SIGNIFICANT CHANGE UP (ref 22–31)
CO2 SERPL-SCNC: 34 MMOL/L — HIGH (ref 22–31)
CO2 SERPL-SCNC: 35 MMOL/L — HIGH (ref 22–31)
CREAT SERPL-MCNC: 1.08 MG/DL — SIGNIFICANT CHANGE UP (ref 0.5–1.3)
CREAT SERPL-MCNC: 1.11 MG/DL — SIGNIFICANT CHANGE UP (ref 0.5–1.3)
CREAT SERPL-MCNC: 1.24 MG/DL — SIGNIFICANT CHANGE UP (ref 0.5–1.3)
CREAT SERPL-MCNC: 1.27 MG/DL — SIGNIFICANT CHANGE UP (ref 0.5–1.3)
GAS PNL BLDA: SIGNIFICANT CHANGE UP
GAS PNL BLDA: SIGNIFICANT CHANGE UP
GLUCOSE SERPL-MCNC: 103 MG/DL — HIGH (ref 70–99)
GLUCOSE SERPL-MCNC: 108 MG/DL — HIGH (ref 70–99)
GLUCOSE SERPL-MCNC: 121 MG/DL — HIGH (ref 70–99)
GLUCOSE SERPL-MCNC: 133 MG/DL — HIGH (ref 70–99)
HCO3 BLDA-SCNC: 28 MMOL/L — SIGNIFICANT CHANGE UP (ref 21–28)
HCO3 BLDA-SCNC: 29 MMOL/L — HIGH (ref 21–28)
HCT VFR BLD CALC: 38.2 % — SIGNIFICANT CHANGE UP (ref 34.5–45)
HGB BLD-MCNC: 13.1 G/DL — SIGNIFICANT CHANGE UP (ref 11.5–15.5)
LACTATE SERPL-SCNC: 1.6 MMOL/L — SIGNIFICANT CHANGE UP (ref 0.5–2)
LACTATE SERPL-SCNC: 1.6 MMOL/L — SIGNIFICANT CHANGE UP (ref 0.5–2)
MAGNESIUM SERPL-MCNC: 1.9 MG/DL — SIGNIFICANT CHANGE UP (ref 1.6–2.6)
MAGNESIUM SERPL-MCNC: 2 MG/DL — SIGNIFICANT CHANGE UP (ref 1.6–2.6)
MCHC RBC-ENTMCNC: 27.9 PG — SIGNIFICANT CHANGE UP (ref 27–34)
MCHC RBC-ENTMCNC: 34.3 GM/DL — SIGNIFICANT CHANGE UP (ref 32–36)
MCV RBC AUTO: 81.4 FL — SIGNIFICANT CHANGE UP (ref 80–100)
NRBC # BLD: 0 /100 WBCS — SIGNIFICANT CHANGE UP (ref 0–0)
PCO2 BLDA: 39 MMHG — SIGNIFICANT CHANGE UP (ref 32–45)
PCO2 BLDA: 39 MMHG — SIGNIFICANT CHANGE UP (ref 32–45)
PH BLDA: 7.48 — HIGH (ref 7.35–7.45)
PH BLDA: 7.49 — HIGH (ref 7.35–7.45)
PHOSPHATE SERPL-MCNC: 4.2 MG/DL — SIGNIFICANT CHANGE UP (ref 2.5–4.5)
PLATELET # BLD AUTO: 205 K/UL — SIGNIFICANT CHANGE UP (ref 150–400)
PO2 BLDA: 84 MMHG — SIGNIFICANT CHANGE UP (ref 83–108)
PO2 BLDA: 91 MMHG — SIGNIFICANT CHANGE UP (ref 83–108)
POTASSIUM SERPL-MCNC: 3.1 MMOL/L — LOW (ref 3.5–5.3)
POTASSIUM SERPL-MCNC: 3.1 MMOL/L — LOW (ref 3.5–5.3)
POTASSIUM SERPL-MCNC: 3.2 MMOL/L — LOW (ref 3.5–5.3)
POTASSIUM SERPL-MCNC: 3.4 MMOL/L — LOW (ref 3.5–5.3)
POTASSIUM SERPL-SCNC: 3.1 MMOL/L — LOW (ref 3.5–5.3)
POTASSIUM SERPL-SCNC: 3.1 MMOL/L — LOW (ref 3.5–5.3)
POTASSIUM SERPL-SCNC: 3.2 MMOL/L — LOW (ref 3.5–5.3)
POTASSIUM SERPL-SCNC: 3.4 MMOL/L — LOW (ref 3.5–5.3)
RBC # BLD: 4.69 M/UL — SIGNIFICANT CHANGE UP (ref 3.8–5.2)
RBC # FLD: 14.9 % — HIGH (ref 10.3–14.5)
SAO2 % BLDA: 96 % — SIGNIFICANT CHANGE UP (ref 95–100)
SAO2 % BLDA: 97 % — SIGNIFICANT CHANGE UP (ref 95–100)
SODIUM SERPL-SCNC: 143 MMOL/L — SIGNIFICANT CHANGE UP (ref 135–145)
SODIUM SERPL-SCNC: 143 MMOL/L — SIGNIFICANT CHANGE UP (ref 135–145)
SODIUM SERPL-SCNC: 146 MMOL/L — HIGH (ref 135–145)
SODIUM SERPL-SCNC: 147 MMOL/L — HIGH (ref 135–145)
WBC # BLD: 11.88 K/UL — HIGH (ref 3.8–10.5)
WBC # FLD AUTO: 11.88 K/UL — HIGH (ref 3.8–10.5)

## 2019-05-12 PROCEDURE — 71045 X-RAY EXAM CHEST 1 VIEW: CPT | Mod: 26

## 2019-05-12 PROCEDURE — 99222 1ST HOSP IP/OBS MODERATE 55: CPT

## 2019-05-12 PROCEDURE — 99291 CRITICAL CARE FIRST HOUR: CPT

## 2019-05-12 RX ORDER — POTASSIUM CHLORIDE 20 MEQ
20 PACKET (EA) ORAL
Refills: 0 | Status: COMPLETED | OUTPATIENT
Start: 2019-05-12 | End: 2019-05-12

## 2019-05-12 RX ORDER — MAGNESIUM SULFATE 500 MG/ML
1 VIAL (ML) INJECTION ONCE
Refills: 0 | Status: COMPLETED | OUTPATIENT
Start: 2019-05-12 | End: 2019-05-12

## 2019-05-12 RX ORDER — POTASSIUM CHLORIDE 20 MEQ
40 PACKET (EA) ORAL ONCE
Refills: 0 | Status: COMPLETED | OUTPATIENT
Start: 2019-05-12 | End: 2019-05-12

## 2019-05-12 RX ORDER — MAGNESIUM SULFATE 500 MG/ML
2 VIAL (ML) INJECTION ONCE
Refills: 0 | Status: COMPLETED | OUTPATIENT
Start: 2019-05-12 | End: 2019-05-12

## 2019-05-12 RX ORDER — POLYETHYLENE GLYCOL 3350 17 G/17G
17 POWDER, FOR SOLUTION ORAL DAILY
Refills: 0 | Status: DISCONTINUED | OUTPATIENT
Start: 2019-05-12 | End: 2019-05-15

## 2019-05-12 RX ORDER — POTASSIUM CHLORIDE 20 MEQ
20 PACKET (EA) ORAL ONCE
Refills: 0 | Status: DISCONTINUED | OUTPATIENT
Start: 2019-05-12 | End: 2019-05-12

## 2019-05-12 RX ORDER — POTASSIUM CHLORIDE 20 MEQ
20 PACKET (EA) ORAL
Refills: 0 | Status: COMPLETED | OUTPATIENT
Start: 2019-05-12 | End: 2019-05-13

## 2019-05-12 RX ORDER — ATORVASTATIN CALCIUM 80 MG/1
40 TABLET, FILM COATED ORAL AT BEDTIME
Refills: 0 | Status: DISCONTINUED | OUTPATIENT
Start: 2019-05-12 | End: 2019-05-15

## 2019-05-12 RX ADMIN — Medication 50 GRAM(S): at 16:21

## 2019-05-12 RX ADMIN — Medication 200 MILLIEQUIVALENT(S): at 01:20

## 2019-05-12 RX ADMIN — Medication 50 MILLIEQUIVALENT(S): at 22:33

## 2019-05-12 RX ADMIN — Medication 200 MILLIEQUIVALENT(S): at 10:58

## 2019-05-12 RX ADMIN — Medication 50 MILLIEQUIVALENT(S): at 14:45

## 2019-05-12 RX ADMIN — HEPARIN SODIUM 5000 UNIT(S): 5000 INJECTION INTRAVENOUS; SUBCUTANEOUS at 05:54

## 2019-05-12 RX ADMIN — Medication 50 MILLIEQUIVALENT(S): at 18:00

## 2019-05-12 RX ADMIN — Medication 100 GRAM(S): at 21:08

## 2019-05-12 RX ADMIN — HEPARIN SODIUM 5000 UNIT(S): 5000 INJECTION INTRAVENOUS; SUBCUTANEOUS at 13:19

## 2019-05-12 RX ADMIN — ATORVASTATIN CALCIUM 40 MILLIGRAM(S): 80 TABLET, FILM COATED ORAL at 21:06

## 2019-05-12 RX ADMIN — Medication 200 MILLIEQUIVALENT(S): at 02:20

## 2019-05-12 RX ADMIN — CLOPIDOGREL BISULFATE 75 MILLIGRAM(S): 75 TABLET, FILM COATED ORAL at 13:19

## 2019-05-12 RX ADMIN — Medication 200 MILLIEQUIVALENT(S): at 08:33

## 2019-05-12 RX ADMIN — BUMETANIDE 2.5 MG/HR: 0.25 INJECTION INTRAMUSCULAR; INTRAVENOUS at 17:19

## 2019-05-12 RX ADMIN — Medication 81 MILLIGRAM(S): at 13:19

## 2019-05-12 RX ADMIN — Medication 40 MILLIEQUIVALENT(S): at 20:47

## 2019-05-12 RX ADMIN — Medication 100 GRAM(S): at 01:22

## 2019-05-12 RX ADMIN — PANTOPRAZOLE SODIUM 40 MILLIGRAM(S): 20 TABLET, DELAYED RELEASE ORAL at 10:59

## 2019-05-12 RX ADMIN — HEPARIN SODIUM 5000 UNIT(S): 5000 INJECTION INTRAVENOUS; SUBCUTANEOUS at 21:07

## 2019-05-12 RX ADMIN — Medication 50 MILLIEQUIVALENT(S): at 20:46

## 2019-05-12 RX ADMIN — Medication 50 MILLIEQUIVALENT(S): at 16:53

## 2019-05-12 RX ADMIN — Medication 200 MILLIEQUIVALENT(S): at 09:47

## 2019-05-12 RX ADMIN — Medication 200 MILLIEQUIVALENT(S): at 04:16

## 2019-05-12 RX ADMIN — CHLORHEXIDINE GLUCONATE 1 APPLICATION(S): 213 SOLUTION TOPICAL at 05:54

## 2019-05-12 NOTE — PROGRESS NOTE ADULT - SUBJECTIVE AND OBJECTIVE BOX
HEALTH ISSUES - PROBLEM Dx:  Transition of care performed with sharing of clinical summary: Transition of care performed with sharing of clinical summary          CHEMOTHERAPY REGIMEN:        Day:                          Diet:  Protocol:                                    IVF:      MEDICATIONS  (STANDING):  aspirin  chewable 81 milliGRAM(s) Oral daily  atorvastatin 40 milliGRAM(s) Oral at bedtime  buMETAnide Infusion 0.5 mG/Hr (2.5 mL/Hr) IV Continuous <Continuous>  chlorhexidine 2% Cloths 1 Application(s) Topical <User Schedule>  clopidogrel Tablet 75 milliGRAM(s) Oral daily  heparin  Injectable 5000 Unit(s) SubCutaneous every 8 hours  pantoprazole  Injectable 40 milliGRAM(s) IV Push daily  polyethylene glycol 3350 17 Gram(s) Oral daily  potassium chloride    Tablet ER 40 milliEquivalent(s) Oral once  potassium chloride  20 mEq/100 mL IVPB 20 milliEquivalent(s) IV Intermittent every 2 hours    MEDICATIONS  (PRN):      Allergies    No Known Allergies    Intolerances        DVT Prophylaxis: [ ] YES [ ] NO      Antibiotics: [ ] YES [ ] NO    Pain Scale (1-10):       Location:    Vital Signs Last 24 Hrs  T(C): 36.9 (12 May 2019 17:56), Max: 36.9 (12 May 2019 17:56)  T(F): 98.4 (12 May 2019 17:56), Max: 98.4 (12 May 2019 17:56)  HR: 100 (12 May 2019 20:17) (94 - 102)  BP: 94/67 (12 May 2019 20:00) (83/59 - 98/68)  BP(mean): 74 (12 May 2019 20:00) (68 - 78)  RR: 19 (12 May 2019 20:17) (10 - 22)  SpO2: 97% (12 May 2019 20:17) (92% - 100%)    Drug Dosing Weight  Height (cm): 149.86 (10 May 2019 07:22)  Weight (kg): 79.1 (10 May 2019 10:30)  BMI (kg/m2): 35.2 (10 May 2019 10:30)  BSA (m2): 1.74 (10 May 2019 10:30)     Physical Exam:  · Constitutional	detailed exam  · Constitutional Details	well-developed; well-groomed  · Eyes	EOMI; PERRL; no drainage or redness  · ENMT Comments	dry mucous membranes  · Respiratory	detailed exam  · Respiratory Comments	normal breath sounds at the lung bases bilaterally  · Cardiovascular	Regular rate & rhythm, normal S1, S2; no murmurs, gallops or rubs; no S3, S4  · Abd-Soft non tender  ·Ext-no edema, clubbing or cyanosis    URINARY CATHETER: [ ] YES [ ] NO     LABS:  CBC Full  -  ( 12 May 2019 06:11 )  WBC Count : 11.88 K/uL  RBC Count : 4.69 M/uL  Hemoglobin : 13.1 g/dL  Hematocrit : 38.2 %  Platelet Count - Automated : 205 K/uL  Mean Cell Volume : 81.4 fl  Mean Cell Hemoglobin : 27.9 pg  Mean Cell Hemoglobin Concentration : 34.3 gm/dL  Auto Neutrophil # : x  Auto Lymphocyte # : x  Auto Monocyte # : x  Auto Eosinophil # : x  Auto Basophil # : x  Auto Neutrophil % : x  Auto Lymphocyte % : x  Auto Monocyte % : x  Auto Eosinophil % : x  Auto Basophil % : x    05-12    146<H>  |  100  |  25<H>  ----------------------------<  121<H>  3.1<L>   |  35<H>  |  1.08    Ca    9.2      12 May 2019 20:04  Phos  4.2     05-12  Mg     1.9     05-12    TPro  5.9<L>  /  Alb  3.2<L>  /  TBili  1.1  /  DBili  x   /  AST  41<H>  /  ALT  53<H>  /  AlkPhos  85  05-11    PT/INR - ( 11 May 2019 05:43 )   PT: 15.4 sec;   INR: 1.35          PTT - ( 11 May 2019 05:43 )  PTT:24.3 sec      CULTURES:    RADIOLOGY & ADDITIONAL STUDIES:

## 2019-05-12 NOTE — SWALLOW BEDSIDE ASSESSMENT ADULT - SWALLOW EVAL: RECOMMENDED FEEDING/EATING TECHNIQUES
position upright (90 degrees)/maintain upright posture during/after eating for 30 mins/oral hygiene/allow for swallow between intakes

## 2019-05-12 NOTE — PROGRESS NOTE ADULT - SUBJECTIVE AND OBJECTIVE BOX
OVERNIGHT EVENTS: Patient net negative 2L, Bumex gtt decreased from 1 to 0.5mg. Patient on BiPAP.   INTERVAL HISTORY: Patient reports feeling well this morning. She reports that her breathing has felt unlabored on BiPAP overnight. Denies chest pain, palpitations, dizziness, lightheadedness, abdominal pain, or nausea.      Vital Signs Last 24 Hrs  T(C): 36.4 (12 May 2019 09:00), Max: 37.3 (11 May 2019 18:00)  T(F): 97.6 (12 May 2019 09:00), Max: 99.1 (11 May 2019 18:00)  HR: 102 (12 May 2019 11:00) (94 - 110)  BP: 90/66 (11 May 2019 14:00) (90/66 - 90/66)  BP(mean): 73 (11 May 2019 14:00) (73 - 73)  RR: 16 (12 May 2019 11:00) (10 - 28)  SpO2: 92% (12 May 2019 11:00) (90% - 100%)      I&O's Summary    11 May 2019 07:01  -  12 May 2019 07:00  --------------------------------------------------------  IN: 816.5 mL / OUT: 3465 mL / NET: -2648.5 mL    12 May 2019 07:01  -  12 May 2019 12:12  --------------------------------------------------------  IN: 310 mL / OUT: 825 mL / NET: -515 mL      PHYSICAL EXAM:  General: NAD, adult female, appears comfortable, cooperative with exam  HEENT: NCAT, PERRL, EOMI, no conjunctival pallor or scleral icterus, MMM  Neck: supple, no LAD or thyromegaly, +JVD; R IJ in place  Respiratory: on BiPAP, no increased work of breathing, decreased breath sounds at bases bilaterally, no crackles/wheezes/rhonchi   Cardiovascular: tachycardic, regular rhythm, normal S1/S2, no m/r/g   Abdominal: soft, NTND, +BS   : John draining clear yellow urine  Extremities: WWP, no cyanosis, clubbing, 1+ pitting edema of bilateral LE; R radial arterial line  Vascular: radial pulses and DP 2+ bilaterally   Neurological: AAOx3, no CN deficits, strength and sensation intact throughout  Skin: no gross skin abnormalities or rashes      LABS:                        13.1   11.88 )-----------( 205      ( 12 May 2019 06:11 )             38.2     05-12    143  |  101  |  26<H>  ----------------------------<  103<H>  3.4<L>   |  28  |  1.24    Ca    9.3      12 May 2019 06:11  Phos  4.2     05-12  Mg     2.0     05-12    TPro  5.9<L>  /  Alb  3.2<L>  /  TBili  1.1  /  DBili  x   /  AST  41<H>  /  ALT  53<H>  /  AlkPhos  85  05-11    PT/INR - ( 11 May 2019 05:43 )   PT: 15.4 sec;   INR: 1.35     PTT - ( 11 May 2019 05:43 )  PTT:24.3 sec      RADIOLOGY & ADDITIONAL TESTS:  Xray Chest 1 View- PORTABLE-Urgent (05.12.19 @ 05:19)  Impression: Congestion and/or infiltrates. Bilateral effusions      MEDICATIONS  (STANDING):  aspirin  chewable 81 milliGRAM(s) Oral daily  buMETAnide Infusion 0.5 mG/Hr (2.5 mL/Hr) IV Continuous <Continuous>  chlorhexidine 2% Cloths 1 Application(s) Topical <User Schedule>  clopidogrel Tablet 75 milliGRAM(s) Oral daily  heparin  Injectable 5000 Unit(s) SubCutaneous every 8 hours  pantoprazole  Injectable 40 milliGRAM(s) IV Push daily    MEDICATIONS  (PRN):

## 2019-05-12 NOTE — SWALLOW BEDSIDE ASSESSMENT ADULT - ASR SWALLOW ASPIRATION MONITOR
change of breathing pattern/position upright (90Y)/cough/fever/throat clearing/gurgly voice/pneumonia/upper respiratory infection/oral hygiene

## 2019-05-12 NOTE — SWALLOW BEDSIDE ASSESSMENT ADULT - SLP GENERAL OBSERVATIONS
Pt received awake and alert, sitting in chair. Oriented x3, follows 1-step directives. Voice is mildly dysphonic.

## 2019-05-12 NOTE — PROGRESS NOTE ADULT - ASSESSMENT
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     CV  #Diastolic CHF 2/2 restrictive cardiomyopathy, suspected to be due to systemic amyloidosis  - echo with EF 40%, diastolic dysfunction, concentric LVH, reduced RV systolic function, moderate TR  - CXR improving but still with significant bilateral pulmonary edema, bilateral pleural effusions  - continue Bumex gtt to maintain adequate UOP  - strict I/Os, daily weights, daily CXR  - goal net negative 1.5-2 L/day   - no BB in setting of restrictive cardiomyopathy  - repeat echo in 1-2 days to reassess EF    #CAD  History of NSTEMI.  - continue home ASA, Plavix  - restart home Lipitor 40mg qd once patient able to take PO  - no BB in setting of restrictive cardiomyopathy    #Cardiac arrest  Patient with PEA arrest following anesthesia induction in OR, with ROSC obtained after approximately 5 minutes, epi x1. Suspect 2/2 flash pulmonary edema induced by bradycardia from anesthesia induction in setting of restrictive cardiomyopathy.  - patient now extubated, mentating at baseline  - plan as above    PULM  #Acute hypoxic respiratory failure  Likely 2/2 flash pulmonary edema from bradycardia during anesthesia induction.   - s/p extubation 5/11  - continue BiPAP and HFNC as needed  - wean NC O2 requirements as tolerated    RENAL  #Hypokalemia  Likely 2/2 aggressive diuresis on Bumex drip.  - close monitoring of K on BMP  - aggressive repletion while getting diuresis    #JOSE  Cr elevated to 1.3, baseline 0.9, likely 2/2 hypoperfusion during cardiac arrest.   - Cr improving  - monitor BMP  - avoid nephrotoxic meds     HEME  #Plasma cell dyscrasia, amyloidosis  - f/u abdominal fat pad biopsy  - continue outpatient management for amyloid, patient to be followed by Dr. Lewis    FEN/GI/Ppx  F: none  E: replete PRN  N: NPO pending speech and swallow eval  DVT ppx: heparin subQ  GI: PPI while on DAPT    Full Code    Dispo: CCU

## 2019-05-13 ENCOUNTER — APPOINTMENT (OUTPATIENT)
Dept: HEART AND VASCULAR | Facility: CLINIC | Age: 49
End: 2019-05-13

## 2019-05-13 LAB
ANION GAP SERPL CALC-SCNC: 7 MMOL/L — SIGNIFICANT CHANGE UP (ref 5–17)
ANION GAP SERPL CALC-SCNC: 9 MMOL/L — SIGNIFICANT CHANGE UP (ref 5–17)
BUN SERPL-MCNC: 21 MG/DL — SIGNIFICANT CHANGE UP (ref 7–23)
BUN SERPL-MCNC: 21 MG/DL — SIGNIFICANT CHANGE UP (ref 7–23)
CALCIUM SERPL-MCNC: 9 MG/DL — SIGNIFICANT CHANGE UP (ref 8.4–10.5)
CALCIUM SERPL-MCNC: 9.2 MG/DL — SIGNIFICANT CHANGE UP (ref 8.4–10.5)
CHLORIDE SERPL-SCNC: 100 MMOL/L — SIGNIFICANT CHANGE UP (ref 96–108)
CHLORIDE SERPL-SCNC: 101 MMOL/L — SIGNIFICANT CHANGE UP (ref 96–108)
CO2 SERPL-SCNC: 36 MMOL/L — HIGH (ref 22–31)
CO2 SERPL-SCNC: 38 MMOL/L — HIGH (ref 22–31)
CREAT SERPL-MCNC: 0.99 MG/DL — SIGNIFICANT CHANGE UP (ref 0.5–1.3)
CREAT SERPL-MCNC: 1 MG/DL — SIGNIFICANT CHANGE UP (ref 0.5–1.3)
GLUCOSE SERPL-MCNC: 101 MG/DL — HIGH (ref 70–99)
GLUCOSE SERPL-MCNC: 103 MG/DL — HIGH (ref 70–99)
HCT VFR BLD CALC: 36 % — SIGNIFICANT CHANGE UP (ref 34.5–45)
HGB BLD-MCNC: 12 G/DL — SIGNIFICANT CHANGE UP (ref 11.5–15.5)
MAGNESIUM SERPL-MCNC: 1.8 MG/DL — SIGNIFICANT CHANGE UP (ref 1.6–2.6)
MCHC RBC-ENTMCNC: 27.5 PG — SIGNIFICANT CHANGE UP (ref 27–34)
MCHC RBC-ENTMCNC: 33.3 GM/DL — SIGNIFICANT CHANGE UP (ref 32–36)
MCV RBC AUTO: 82.6 FL — SIGNIFICANT CHANGE UP (ref 80–100)
NRBC # BLD: 0 /100 WBCS — SIGNIFICANT CHANGE UP (ref 0–0)
PHOSPHATE SERPL-MCNC: 2.5 MG/DL — SIGNIFICANT CHANGE UP (ref 2.5–4.5)
PLATELET # BLD AUTO: 197 K/UL — SIGNIFICANT CHANGE UP (ref 150–400)
POTASSIUM SERPL-MCNC: 3.6 MMOL/L — SIGNIFICANT CHANGE UP (ref 3.5–5.3)
POTASSIUM SERPL-MCNC: 3.8 MMOL/L — SIGNIFICANT CHANGE UP (ref 3.5–5.3)
POTASSIUM SERPL-SCNC: 3.6 MMOL/L — SIGNIFICANT CHANGE UP (ref 3.5–5.3)
POTASSIUM SERPL-SCNC: 3.8 MMOL/L — SIGNIFICANT CHANGE UP (ref 3.5–5.3)
RBC # BLD: 4.36 M/UL — SIGNIFICANT CHANGE UP (ref 3.8–5.2)
RBC # FLD: 14.7 % — HIGH (ref 10.3–14.5)
SODIUM SERPL-SCNC: 145 MMOL/L — SIGNIFICANT CHANGE UP (ref 135–145)
SODIUM SERPL-SCNC: 146 MMOL/L — HIGH (ref 135–145)
WBC # BLD: 9.55 K/UL — SIGNIFICANT CHANGE UP (ref 3.8–10.5)
WBC # FLD AUTO: 9.55 K/UL — SIGNIFICANT CHANGE UP (ref 3.8–10.5)

## 2019-05-13 PROCEDURE — 93010 ELECTROCARDIOGRAM REPORT: CPT

## 2019-05-13 PROCEDURE — 99291 CRITICAL CARE FIRST HOUR: CPT

## 2019-05-13 PROCEDURE — 99233 SBSQ HOSP IP/OBS HIGH 50: CPT

## 2019-05-13 PROCEDURE — 71045 X-RAY EXAM CHEST 1 VIEW: CPT | Mod: 26

## 2019-05-13 RX ORDER — MAGNESIUM SULFATE 500 MG/ML
2 VIAL (ML) INJECTION ONCE
Refills: 0 | Status: COMPLETED | OUTPATIENT
Start: 2019-05-13 | End: 2019-05-13

## 2019-05-13 RX ORDER — BUMETANIDE 0.25 MG/ML
2 INJECTION INTRAMUSCULAR; INTRAVENOUS
Refills: 0 | Status: DISCONTINUED | OUTPATIENT
Start: 2019-05-13 | End: 2019-05-14

## 2019-05-13 RX ORDER — POTASSIUM CHLORIDE 20 MEQ
20 PACKET (EA) ORAL ONCE
Refills: 0 | Status: COMPLETED | OUTPATIENT
Start: 2019-05-13 | End: 2019-05-13

## 2019-05-13 RX ORDER — POTASSIUM CHLORIDE 20 MEQ
40 PACKET (EA) ORAL EVERY 4 HOURS
Refills: 0 | Status: COMPLETED | OUTPATIENT
Start: 2019-05-13 | End: 2019-05-13

## 2019-05-13 RX ORDER — ACETAZOLAMIDE 250 MG/1
250 TABLET ORAL
Refills: 0 | Status: DISCONTINUED | OUTPATIENT
Start: 2019-05-13 | End: 2019-05-13

## 2019-05-13 RX ORDER — POTASSIUM CHLORIDE 20 MEQ
40 PACKET (EA) ORAL ONCE
Refills: 0 | Status: COMPLETED | OUTPATIENT
Start: 2019-05-13 | End: 2019-05-13

## 2019-05-13 RX ADMIN — Medication 81 MILLIGRAM(S): at 12:22

## 2019-05-13 RX ADMIN — ATORVASTATIN CALCIUM 40 MILLIGRAM(S): 80 TABLET, FILM COATED ORAL at 22:09

## 2019-05-13 RX ADMIN — Medication 20 MILLIEQUIVALENT(S): at 03:08

## 2019-05-13 RX ADMIN — CLOPIDOGREL BISULFATE 75 MILLIGRAM(S): 75 TABLET, FILM COATED ORAL at 12:22

## 2019-05-13 RX ADMIN — Medication 50 MILLIEQUIVALENT(S): at 00:17

## 2019-05-13 RX ADMIN — BUMETANIDE 2 MILLIGRAM(S): 0.25 INJECTION INTRAMUSCULAR; INTRAVENOUS at 09:27

## 2019-05-13 RX ADMIN — HEPARIN SODIUM 5000 UNIT(S): 5000 INJECTION INTRAVENOUS; SUBCUTANEOUS at 22:09

## 2019-05-13 RX ADMIN — Medication 40 MILLIEQUIVALENT(S): at 06:29

## 2019-05-13 RX ADMIN — CHLORHEXIDINE GLUCONATE 1 APPLICATION(S): 213 SOLUTION TOPICAL at 05:16

## 2019-05-13 RX ADMIN — HEPARIN SODIUM 5000 UNIT(S): 5000 INJECTION INTRAVENOUS; SUBCUTANEOUS at 14:13

## 2019-05-13 RX ADMIN — BUMETANIDE 2.5 MG/HR: 0.25 INJECTION INTRAMUSCULAR; INTRAVENOUS at 06:43

## 2019-05-13 RX ADMIN — BUMETANIDE 2 MILLIGRAM(S): 0.25 INJECTION INTRAMUSCULAR; INTRAVENOUS at 18:29

## 2019-05-13 RX ADMIN — PANTOPRAZOLE SODIUM 40 MILLIGRAM(S): 20 TABLET, DELAYED RELEASE ORAL at 12:22

## 2019-05-13 RX ADMIN — Medication 50 GRAM(S): at 06:29

## 2019-05-13 RX ADMIN — Medication 40 MILLIEQUIVALENT(S): at 22:09

## 2019-05-13 RX ADMIN — HEPARIN SODIUM 5000 UNIT(S): 5000 INJECTION INTRAVENOUS; SUBCUTANEOUS at 06:17

## 2019-05-13 NOTE — PROGRESS NOTE ADULT - ASSESSMENT
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     CV  #Diastolic CHF 2/2 restrictive cardiomyopathy, suspected to be due to systemic amyloidosis  - echo with EF 40%, diastolic dysfunction, concentric LVH, reduced RV systolic function, moderate TR  - CXR improving but still with significant bilateral pulmonary edema, bilateral pleural effusions  - continue Bumex gtt to maintain adequate UOP  - strict I/Os, daily weights, daily CXR  - goal net negative 1.5-2 L/day   - no BB in setting of restrictive cardiomyopathy  - repeat echo to reassess EF    #CAD  History of NSTEMI.  - continue home ASA, Plavix  - restart home Lipitor 40mg qd once patient able to take PO    #Cardiac arrest  Patient with PEA arrest following anesthesia induction in OR, with ROSC obtained after approximately 5 minutes, epi x1. Suspect 2/2 flash pulmonary edema induced by bradycardia from anesthesia induction in setting of restrictive cardiomyopathy.  - patient now extubated, mentating at baseline  - plan as above    PULM  #Acute hypoxic respiratory failure  Likely 2/2 flash pulmonary edema from bradycardia during anesthesia induction.   - s/p extubation 5/11  - continue BiPAP and HFNC as needed  - wean NC O2 requirements as tolerated    RENAL  #Hypokalemia, hypomagnesemia  Likely 2/2 aggressive diuresis on Bumex drip.  - monitor BMP BID  - aggressive repletion while getting diuresis    #JOSE  Cr elevated to 1.3, baseline 0.9, likely 2/2 hypoperfusion during cardiac arrest.   - Cr improving  - monitor BMP  - avoid nephrotoxic meds     HEME  #Plasma cell dyscrasia, amyloidosis  - f/u abdominal fat pad biopsy  - continue outpatient management for amyloid, patient to be followed by Dr. 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     CV  #Diastolic CHF 2/2 restrictive cardiomyopathy, suspected to be due to systemic amyloidosis  - echo with EF 40%, diastolic dysfunction, concentric LVH, reduced RV systolic function, moderate TR  - CXR improving but still with significant bilateral pulmonary edema, bilateral pleural effusions  - continue Bumex gtt to maintain adequate UOP  - strict I/Os, daily weights, daily CXR  - goal net negative 1.5-2 L/day   - no BB in setting of restrictive cardiomyopathy  - repeat echo to reassess EF    #CAD  History of NSTEMI.  - continue home ASA, Plavix  - restart home Lipitor 40mg qd once patient able to take PO    #Cardiac arrest  Patient with PEA arrest following anesthesia induction in OR, with ROSC obtained after approximately 5 minutes, epi x1. Suspect 2/2 flash pulmonary edema induced by bradycardia from anesthesia induction in setting of restrictive cardiomyopathy.  - patient now extubated, mentating at baseline  - plan as above    PULM  #Acute hypoxic respiratory failure  Likely 2/2 flash pulmonary edema from bradycardia during anesthesia induction.   - s/p extubation 5/11  - continue BiPAP and HFNC as needed  - wean NC O2 requirements as tolerated    RENAL  #Hypokalemia, hypomagnesemia  Likely 2/2 aggressive diuresis on Bumex drip.  - monitor BMP BID  - aggressive repletion while getting diuresis    #JOSE  RESOLVED  Cr elevated to 1.3, baseline 0.9, likely 2/2 hypoperfusion during cardiac arrest.   - Cr improving  - monitor BMP  - avoid nephrotoxic meds     HEME  #Plasma cell dyscrasia, amyloidosis  - f/u abdominal fat pad biopsy  - continue outpatient management for amyloid, patient to be followed by Dr. Lewis    FEN/GI/Ppx  F: none  E: replete PRN  N: DASH/TLC diet po  DVT ppx: heparin subQ  GI: PPI while on DAPT    Code Status: Full Code    Dispo: CCU to [facility]

## 2019-05-13 NOTE — PROGRESS NOTE ADULT - SUBJECTIVE AND OBJECTIVE BOX
HEALTH ISSUES - PROBLEM Dx:  Restrictive cardiomyopathy: Restrictive cardiomyopathy  Transition of care performed with sharing of clinical summary: Transition of care performed with sharing of clinical summary          CHEMOTHERAPY REGIMEN:        Day:                          Diet:  Protocol:                                    IVF:      MEDICATIONS  (STANDING):  aspirin  chewable 81 milliGRAM(s) Oral daily  atorvastatin 40 milliGRAM(s) Oral at bedtime  buMETAnide Injectable 2 milliGRAM(s) IV Push two times a day  chlorhexidine 2% Cloths 1 Application(s) Topical <User Schedule>  clopidogrel Tablet 75 milliGRAM(s) Oral daily  heparin  Injectable 5000 Unit(s) SubCutaneous every 8 hours  pantoprazole  Injectable 40 milliGRAM(s) IV Push daily  polyethylene glycol 3350 17 Gram(s) Oral daily    MEDICATIONS  (PRN):      Allergies    No Known Allergies    Intolerances        DVT Prophylaxis: [ ] YES [ ] NO      Antibiotics: [ ] YES [ ] NO    Pain Scale (1-10):       Location:    Vital Signs Last 24 Hrs  T(C): 36.7 (13 May 2019 16:31), Max: 37 (13 May 2019 13:00)  T(F): 98.1 (13 May 2019 16:31), Max: 98.6 (13 May 2019 13:00)  HR: 98 (13 May 2019 18:00) (94 - 116)  BP: 85/57 (13 May 2019 18:00) (72/46 - 99/69)  BP(mean): 65 (13 May 2019 18:00) (57 - 80)  RR: 19 (13 May 2019 18:00) (17 - 35)  SpO2: 95% (13 May 2019 18:00) (84% - 100%)    Drug Dosing Weight  Height (cm): 149.86 (10 May 2019 07:22)  Weight (kg): 79.1 (10 May 2019 10:30)  BMI (kg/m2): 35.2 (10 May 2019 10:30)  BSA (m2): 1.74 (10 May 2019 10:30)     Physical Exam:  · Constitutional	detailed exam  · Constitutional Details	well-developed; well-groomed  · Eyes	EOMI; PERRL; no drainage or redness  · ENMT Comments	dry mucous membranes  · Respiratory	detailed exam  · Respiratory Comments	normal breath sounds at the lung bases bilaterally  · Cardiovascular	Regular rate & rhythm, normal S1, S2; no murmurs, gallops or rubs; no S3, S4  · Abd-Soft non tender  ·Ext-no edema, clubbing or cyanosis    URINARY CATHETER: [ ] YES [ ] NO     LABS:  CBC Full  -  ( 13 May 2019 05:35 )  WBC Count : 9.55 K/uL  RBC Count : 4.36 M/uL  Hemoglobin : 12.0 g/dL  Hematocrit : 36.0 %  Platelet Count - Automated : 197 K/uL  Mean Cell Volume : 82.6 fl  Mean Cell Hemoglobin : 27.5 pg  Mean Cell Hemoglobin Concentration : 33.3 gm/dL  Auto Neutrophil # : x  Auto Lymphocyte # : x  Auto Monocyte # : x  Auto Eosinophil # : x  Auto Basophil # : x  Auto Neutrophil % : x  Auto Lymphocyte % : x  Auto Monocyte % : x  Auto Eosinophil % : x  Auto Basophil % : x    05-13    146<H>  |  101  |  21  ----------------------------<  101<H>  3.6   |  38<H>  |  1.00    Ca    9.0      13 May 2019 05:35  Phos  2.5     05-13  Mg     1.8     05-13            CULTURES:    RADIOLOGY & ADDITIONAL STUDIES:

## 2019-05-13 NOTE — PROGRESS NOTE ADULT - SUBJECTIVE AND OBJECTIVE BOX
INTERVAL HPI/OVERNIGHT EVENTS: Comfortable on HFNC. Tolerating diet. Moses repleted x2.     SUBJECTIVE: Patient seen and examined at bedside. No new concerns or complaints. Eating breakfast. Denies headache, fever, chills, syncope, near-syncope, chest pain, palpitations, shortness of breath, abdominal pain, nausea, vomiting, constipation, diarrhea, dysuria.    OBJECTIVE:    VITAL SIGNS:  ICU Vital Signs Last 24 Hrs  T(C): 36.6 (13 May 2019 06:00), Max: 36.9 (12 May 2019 17:56)  T(F): 97.8 (13 May 2019 06:00), Max: 98.4 (12 May 2019 17:56)  HR: 100 (13 May 2019 07:28) (94 - 112)  BP: 83/65 (13 May 2019 07:28) (82/62 - 99/68)  BP(mean): 71 (13 May 2019 07:28) (66 - 78)  ABP: 96/68 (12 May 2019 12:00) (94/60 - 98/84)  ABP(mean): 78 (12 May 2019 12:00) (72 - 88)  RR: 23 (13 May 2019 07:28) (13 - 24)  SpO2: 93% (13 May 2019 07:28) (92% - 100%)        05-12 @ 07:01 - 05-13 @ 07:00  --------------------------------------------------------  IN: 1860 mL / OUT: 4690 mL / NET: -2830 mL    05-13 @ 07:01 - 05-13 @ 07:54  --------------------------------------------------------  IN: 2.5 mL / OUT: 0 mL / NET: 2.5 mL      CAPILLARY BLOOD GLUCOSE          PHYSICAL EXAM:    General: Adult Black female, overweight, well-groomed, well-developed, seated upright in bed; NAD  HEENT: PERRL; mucous membranes moist  Respiratory: clear to auscultation bilaterally; no rales noted  Cardiovascular: regular rate and rhythm, no murmur appreciated  Gastrointestinal: soft, non-tender, non-distended  Extremities: warm, well-perfused; trace pedal edema  Skin: good skin turgor; no rash  Neurological: AOx3, following commands, moving 4x extremities spontaneously  Lines/Drains/Tubes: CVL R IJ, PIV L wrist, PIV R antecubital fossa; John; R radial a line    MEDICATIONS:  MEDICATIONS  (STANDING):  aspirin  chewable 81 milliGRAM(s) Oral daily  atorvastatin 40 milliGRAM(s) Oral at bedtime  buMETAnide Infusion 0.5 mG/Hr (2.5 mL/Hr) IV Continuous <Continuous>  chlorhexidine 2% Cloths 1 Application(s) Topical <User Schedule>  clopidogrel Tablet 75 milliGRAM(s) Oral daily  heparin  Injectable 5000 Unit(s) SubCutaneous every 8 hours  pantoprazole  Injectable 40 milliGRAM(s) IV Push daily  polyethylene glycol 3350 17 Gram(s) Oral daily    MEDICATIONS  (PRN):      ALLERGIES:  Allergies    No Known Allergies    Intolerances        LABS:                        12.0   9.55  )-----------( 197      ( 13 May 2019 05:35 )             36.0     05-13    146<H>  |  101  |  21  ----------------------------<  101<H>  3.6   |  38<H>  |  1.00    Ca    9.0      13 May 2019 05:35  Phos  2.5     05-13  Mg     1.8     05-13                  RADIOLOGY & ADDITIONAL TESTS: Reviewed. TRANSFER NOTE: CCU TO [facility]    HOSPITAL COURSE: 49yoF with dCHF, hx CVA and NSTEMI, concern for amyloidosis, admitted post-operatively after abdominal fat pad biopsy due to post-induction bradycardic PEA arrest associated with flash pulmonary edema, likely a consequence of induced bradycardia in the setting of general anesthesia, now with mechanically vented and sedated; time down was 5 minutes with patient receiving CPR per ACLS protocol until ROSC achieved after 2 rounds, 1x dose epi. Pressor support was with Levophed, then epi drip, which was discontinued after patient arrived at CCU, mechanically intubated and sedated. Patient had poor urine output initially on Lasix and metolazone so she was switched to Bumex drip, and thereafter was diuresed to euvolemic state. She was extubated after 24 hours, and transistioned to BiPAP, eventually weaned to HFNC and then NC with adequate diuresis. She is mentating at baseline, tolerating PO, and has been OOB to chair. She is stable for stepdown to floors under cardiology care for further medical management.    INTERVAL HPI/OVERNIGHT EVENTS: Resting comfortably on HFNC. Tolerating diet. Lytes repleted x2. This AM, transitioned successfully to nasal cannula O2.    SUBJECTIVE: Patient seen and examined at bedside. No new concerns or complaints. Eating breakfast. Denies headache, fever, chills, syncope, near-syncope, chest pain, palpitations, shortness of breath, abdominal pain, nausea, vomiting, constipation, diarrhea, dysuria.    OBJECTIVE:    VITAL SIGNS:  ICU Vital Signs Last 24 Hrs  T(C): 36.6 (13 May 2019 06:00), Max: 36.9 (12 May 2019 17:56)  T(F): 97.8 (13 May 2019 06:00), Max: 98.4 (12 May 2019 17:56)  HR: 100 (13 May 2019 07:28) (94 - 112)  BP: 83/65 (13 May 2019 07:28) (82/62 - 99/68)  BP(mean): 71 (13 May 2019 07:28) (66 - 78)  ABP: 96/68 (12 May 2019 12:00) (94/60 - 98/84)  ABP(mean): 78 (12 May 2019 12:00) (72 - 88)  RR: 23 (13 May 2019 07:28) (13 - 24)  SpO2: 93% (13 May 2019 07:28) (92% - 100%)        05-12 @ 07:01 - 05-13 @ 07:00  --------------------------------------------------------  IN: 1860 mL / OUT: 4690 mL / NET: -2830 mL    05-13 @ 07:01 - 05-13 @ 07:54  --------------------------------------------------------  IN: 2.5 mL / OUT: 0 mL / NET: 2.5 mL      CAPILLARY BLOOD GLUCOSE          PHYSICAL EXAM:    General: Adult Black female, overweight, well-groomed, well-developed, seated upright in bed; NAD  HEENT: PERRL; mucous membranes moist  Respiratory: clear to auscultation bilaterally; no rales noted  Cardiovascular: regular rate and rhythm, no murmur appreciated  Gastrointestinal: soft, non-tender, non-distended  Extremities: warm, well-perfused; trace pedal edema  Skin: good skin turgor; no rash  Neurological: AOx3, following commands, moving 4x extremities spontaneously  Lines/Drains/Tubes: CVL R IJ, PIV L wrist, PIV R antecubital fossa; John; R radial a line    MEDICATIONS:  MEDICATIONS  (STANDING):  aspirin  chewable 81 milliGRAM(s) Oral daily  atorvastatin 40 milliGRAM(s) Oral at bedtime  buMETAnide Infusion 0.5 mG/Hr (2.5 mL/Hr) IV Continuous <Continuous>  chlorhexidine 2% Cloths 1 Application(s) Topical <User Schedule>  clopidogrel Tablet 75 milliGRAM(s) Oral daily  heparin  Injectable 5000 Unit(s) SubCutaneous every 8 hours  pantoprazole  Injectable 40 milliGRAM(s) IV Push daily  polyethylene glycol 3350 17 Gram(s) Oral daily    MEDICATIONS  (PRN):      ALLERGIES:  Allergies    No Known Allergies    Intolerances        LABS:                        12.0   9.55  )-----------( 197      ( 13 May 2019 05:35 )             36.0     05-13    146<H>  |  101  |  21  ----------------------------<  101<H>  3.6   |  38<H>  |  1.00    Ca    9.0      13 May 2019 05:35  Phos  2.5     05-13  Mg     1.8     05-13                  RADIOLOGY & ADDITIONAL TESTS: Reviewed.

## 2019-05-14 LAB
ANION GAP SERPL CALC-SCNC: 10 MMOL/L — SIGNIFICANT CHANGE UP (ref 5–17)
ANION GAP SERPL CALC-SCNC: 8 MMOL/L — SIGNIFICANT CHANGE UP (ref 5–17)
BUN SERPL-MCNC: 16 MG/DL — SIGNIFICANT CHANGE UP (ref 7–23)
BUN SERPL-MCNC: 16 MG/DL — SIGNIFICANT CHANGE UP (ref 7–23)
CALCIUM SERPL-MCNC: 8.9 MG/DL — SIGNIFICANT CHANGE UP (ref 8.4–10.5)
CALCIUM SERPL-MCNC: 9.1 MG/DL — SIGNIFICANT CHANGE UP (ref 8.4–10.5)
CHLORIDE SERPL-SCNC: 99 MMOL/L — SIGNIFICANT CHANGE UP (ref 96–108)
CHLORIDE SERPL-SCNC: 99 MMOL/L — SIGNIFICANT CHANGE UP (ref 96–108)
CO2 SERPL-SCNC: 33 MMOL/L — HIGH (ref 22–31)
CO2 SERPL-SCNC: 36 MMOL/L — HIGH (ref 22–31)
CREAT SERPL-MCNC: 0.91 MG/DL — SIGNIFICANT CHANGE UP (ref 0.5–1.3)
CREAT SERPL-MCNC: 0.98 MG/DL — SIGNIFICANT CHANGE UP (ref 0.5–1.3)
GLUCOSE SERPL-MCNC: 102 MG/DL — HIGH (ref 70–99)
GLUCOSE SERPL-MCNC: 120 MG/DL — HIGH (ref 70–99)
HCT VFR BLD CALC: 35.4 % — SIGNIFICANT CHANGE UP (ref 34.5–45)
HGB BLD-MCNC: 12 G/DL — SIGNIFICANT CHANGE UP (ref 11.5–15.5)
MAGNESIUM SERPL-MCNC: 1.8 MG/DL — SIGNIFICANT CHANGE UP (ref 1.6–2.6)
MAGNESIUM SERPL-MCNC: 2.1 MG/DL — SIGNIFICANT CHANGE UP (ref 1.6–2.6)
MCHC RBC-ENTMCNC: 27.7 PG — SIGNIFICANT CHANGE UP (ref 27–34)
MCHC RBC-ENTMCNC: 33.9 GM/DL — SIGNIFICANT CHANGE UP (ref 32–36)
MCV RBC AUTO: 81.8 FL — SIGNIFICANT CHANGE UP (ref 80–100)
NRBC # BLD: 0 /100 WBCS — SIGNIFICANT CHANGE UP (ref 0–0)
PHOSPHATE SERPL-MCNC: 2.3 MG/DL — LOW (ref 2.5–4.5)
PLATELET # BLD AUTO: 199 K/UL — SIGNIFICANT CHANGE UP (ref 150–400)
POTASSIUM SERPL-MCNC: 3.8 MMOL/L — SIGNIFICANT CHANGE UP (ref 3.5–5.3)
POTASSIUM SERPL-MCNC: 3.9 MMOL/L — SIGNIFICANT CHANGE UP (ref 3.5–5.3)
POTASSIUM SERPL-SCNC: 3.8 MMOL/L — SIGNIFICANT CHANGE UP (ref 3.5–5.3)
POTASSIUM SERPL-SCNC: 3.9 MMOL/L — SIGNIFICANT CHANGE UP (ref 3.5–5.3)
RBC # BLD: 4.33 M/UL — SIGNIFICANT CHANGE UP (ref 3.8–5.2)
RBC # FLD: 14.7 % — HIGH (ref 10.3–14.5)
SODIUM SERPL-SCNC: 142 MMOL/L — SIGNIFICANT CHANGE UP (ref 135–145)
SODIUM SERPL-SCNC: 143 MMOL/L — SIGNIFICANT CHANGE UP (ref 135–145)
SURGICAL PATHOLOGY STUDY: SIGNIFICANT CHANGE UP
WBC # BLD: 9.87 K/UL — SIGNIFICANT CHANGE UP (ref 3.8–10.5)
WBC # FLD AUTO: 9.87 K/UL — SIGNIFICANT CHANGE UP (ref 3.8–10.5)

## 2019-05-14 PROCEDURE — 93306 TTE W/DOPPLER COMPLETE: CPT | Mod: 26

## 2019-05-14 PROCEDURE — 71045 X-RAY EXAM CHEST 1 VIEW: CPT | Mod: 26

## 2019-05-14 PROCEDURE — 99233 SBSQ HOSP IP/OBS HIGH 50: CPT

## 2019-05-14 PROCEDURE — 99291 CRITICAL CARE FIRST HOUR: CPT

## 2019-05-14 RX ORDER — POTASSIUM CHLORIDE 20 MEQ
20 PACKET (EA) ORAL ONCE
Refills: 0 | Status: COMPLETED | OUTPATIENT
Start: 2019-05-14 | End: 2019-05-14

## 2019-05-14 RX ORDER — MAGNESIUM SULFATE 500 MG/ML
1 VIAL (ML) INJECTION ONCE
Refills: 0 | Status: COMPLETED | OUTPATIENT
Start: 2019-05-14 | End: 2019-05-14

## 2019-05-14 RX ADMIN — Medication 81 MILLIGRAM(S): at 11:52

## 2019-05-14 RX ADMIN — CHLORHEXIDINE GLUCONATE 1 APPLICATION(S): 213 SOLUTION TOPICAL at 05:59

## 2019-05-14 RX ADMIN — PANTOPRAZOLE SODIUM 40 MILLIGRAM(S): 20 TABLET, DELAYED RELEASE ORAL at 11:52

## 2019-05-14 RX ADMIN — Medication 40 MILLIEQUIVALENT(S): at 02:54

## 2019-05-14 RX ADMIN — BUMETANIDE 2 MILLIGRAM(S): 0.25 INJECTION INTRAMUSCULAR; INTRAVENOUS at 05:59

## 2019-05-14 RX ADMIN — POLYETHYLENE GLYCOL 3350 17 GRAM(S): 17 POWDER, FOR SOLUTION ORAL at 11:52

## 2019-05-14 RX ADMIN — HEPARIN SODIUM 5000 UNIT(S): 5000 INJECTION INTRAVENOUS; SUBCUTANEOUS at 22:12

## 2019-05-14 RX ADMIN — CLOPIDOGREL BISULFATE 75 MILLIGRAM(S): 75 TABLET, FILM COATED ORAL at 11:52

## 2019-05-14 RX ADMIN — Medication 20 MILLIEQUIVALENT(S): at 06:02

## 2019-05-14 RX ADMIN — HEPARIN SODIUM 5000 UNIT(S): 5000 INJECTION INTRAVENOUS; SUBCUTANEOUS at 06:00

## 2019-05-14 RX ADMIN — ATORVASTATIN CALCIUM 40 MILLIGRAM(S): 80 TABLET, FILM COATED ORAL at 22:12

## 2019-05-14 RX ADMIN — HEPARIN SODIUM 5000 UNIT(S): 5000 INJECTION INTRAVENOUS; SUBCUTANEOUS at 14:24

## 2019-05-14 RX ADMIN — Medication 100 GRAM(S): at 06:02

## 2019-05-14 NOTE — PROGRESS NOTE ADULT - ATTENDING COMMENTS
50 yo female with a PMhx HTN, Sickle Cell Trait,   With newly diagnosed AL amyloid.    who presents with a chief complaint of cardiac arrest after general anesthesia induction After induction of general anesthesia and intubation, patient experienced cardiac arrest/PEA. Chest compressions were started and epinephrine was given. ROSC was achieved. Discussion was had with cardiology and decision was made to proceed with the procedure given the likely underlying etiology of her arrest. Patient remained on pressor support throughout the case. Procedure otherwise uneventful. She was then transferred to the CCU post-operatively, intubated and on an epinephrine gtt.      Heme (Aliyah Debbie)  Immunoglobin [id number], Kappa 0.02  BM aspirate 4/25/19 c/w plasma cell neoplasm.  edu with biventricular thickening and biatrial enlargement suggestive of restrictive cm - possible amyloid    COLON on minimal exertion  occasional dizziness.    Refractory edema. Did not respond to po Lasix, or po torsemide (40)  Metolazone just added 2 days ago.    AL Amyloid  Agree with aggressive diuresis by primary team  Goal 1-2 Liter negative today  Continue ASA and Plavix  Will follow results of abdominal fat pad biopsy    I have personally provided 30 minutes of critical care time concurrently with the fellow.  I have reviewed the fellow’s documentation and I agree with the fellow's assessment and plan of care.    TC Rodas
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     Euvolemic  Start bumex 2 mg po qd  Short runs of NSVT: monitor  Abdominal fat pad + for amyloid.  Plans underway for chemotx for AL amyloid.    I have personally provided 30 minutes of critical care time concurrently with the resident and  fellow.  I have reviewed the resident’s documentation and I agree with the resident’s assessment and plan of care.  TC Rodas
49F w/ rCMP 2/2 Amyloidosis and h/o CAD presented today for fat pad biopsy c/b PEA arrest and acute hypoxemic respiratory failure requiring intubation, tx'd to CCU for further mgmt    -Pt. is currently HD stable, sedation dc'd this AM -> did well on CPAP trial, now s/p extubation  -HFpEF - 2/2 Cardiac Amyloid w/ rCMP - currently remains volume up on exam, but improving; c/w Bumex gtt, will dc later today and move to Bumex 2mg IV BID; f/u Biopsy results; No BB or afterload reduction  -Pulm - Acute hypoxemic respiratory failure 2/2 Pulm edema, initially requiring mechanical ventilation, s/p improvement w/ diuresis, now extubated. Tolerating NC, will c/w IV diuresis  -JOSE - Mild JOSE 2/2 PEA Arrest, BUN/Cr. now stable and UOP has increased; Continue to monitor  -DVT PPx: HSQ  -DASH diet  -d/c A-Line; TLC and John to be removed tmrw  -Full Code    Roxanna Guillermo MD  CCU Attending    I have personally provided 70 minutes of critical care time excluding time spent on procedures.
49F w/ rCMP 2/2 Amyloidosis and h/o CAD presented today for fat pad biopsy c/b PEA arrest and acute hypoxemic respiratory failure requiring intubation, tx'd to CCU for further mgmt    -Pt. is currently HD stable; States he breathing continues to improve; Denies CP or lightheadedness  -HFpEF - 2/2 Cardiac Amyloid w/ rCMP -  remains volume up on exam, but improving; c/w Bumex gtt, decrease to 0.5mg/hr, goal net neg 1.5-2L; f/u biopsy results   -Pulm - Acute hypoxemic respiratory failure 2/2 Pulm edema, initially requiring mechanical ventilation, s/p extubation; c/w HFNC and wean as tolerated; c/w diuresis on Bumex gtt  -JOSE - Mild JOSE 2/2 PEA Arrest, BUN/Cr. now stable, Good UOP on Bumex gtt, will decrease to 0.5mg/hr  -DVT PPx: HSQ  -DASH diet  -d/c TLC tmrw and Alvaro after bumex gtt dc'd(tmrw)  -OOB to chair  -Full Code    Roxanna Guillermo MD  CCU Attending    I have personally provided 70 minutes of critical care time excluding time spent on procedures.
Patient seen and examined in CCU  Wound clean and dry  Care per primary team  Plan discussed with patient
49F w/ rCMP 2/2 Amyloidosis and h/o CAD presented today for fat pad biopsy c/b PEA arrest and acute hypoxemic respiratory failure requiring intubation, tx'd to CCU for further mgmt    -Pt. is currently HD stable, intubated and sedated  -PEA arrest - likely 2/2 combination of Cardiomyopathy & flash pulmonary edema; Currently stable from electrical and hemodynamic perspective, weaned off Epi gtt  -Resp Failure - 2/2 Flash Pulm edema; Diuresing well after Lasix 80 IV w/ decreasing vent requirements; Will continue w/ IV diuresis and f/u ABG; Pt. likely to be extubated tmrw  -rCMP - 2/2 Cardiac amyloid, f/u Biopsy results; HF team following, appreciate recs  -CAD - c/w ASA and Plavix  -DVT PPx: HSQ  -Full Code    Roxanna Guillermo MD  CCU Attending    I have personally provided 90 minutes of critical care time excluding time spent on procedures.
49yoF with dCHF, hx CVA and NSTEMI, concern for amyloidosis, admitted post-operatively after abdominal fat pad biopsy due to post-induction bradycardic PEA arrest associated with flash pulmonary edema, likely a consequence of induced bradycardia in the setting of general anesthesia, now with mechanically vented and sedated; time down was 5 minutes with patient receiving CPR per ACLS protocol until ROSC achieved after 2 rounds, 1x dose epi. Pressor support was with Levophed, then epi drip, which was discontinued after patient arrived at CCU, mechanically intubated and sedated. Patient had poor urine output initially on Lasix and metolazone so she was switched to Bumex drip, and thereafter was diuresed to euvolemic state. She was extubated after 24 hours, and transistioned to BiPAP, eventually weaned to HFNC and then NC with adequate diuresis. She is mentating at baseline, tolerating PO, and has been OOB to chair. She is stable for stepdown to floors under cardiology care for further medical management.    INTERVAL HPI/OVERNIGHT EVENTS: Resting comfortably on HFNC. Tolerating diet. Lytes repleted x2. This AM, transitioned successfully to nasal cannula O2.  CV  #Diastolic CHF 2/2 restrictive cardiomyopathy, suspected to be due to systemic amyloidosis  - echo with EF 40%, diastolic dysfunction, concentric LVH, reduced RV systolic function, moderate TR  - CXR improving but still with significant bilateral pulmonary edema, bilateral pleural effusions  - change to po Bumex 2 bid  - no BB in setting of restrictive cardiomyopathy  - repeat echo to reassess EF      #Cardiac arrest  Patient with PEA arrest following anesthesia induction in OR, with ROSC obtained after approximately 5 minutes, epi x1. Suspect 2/2 flash pulmonary edema induced by bradycardia from anesthesia induction in setting of restrictive cardiomyopathy.  - patient now extubated, mentating at baseline  - plan as above      RENAL  #Hypokalemia, hypomagnesemia  Likely 2/2 aggressive diuresis on Bumex drip.  - monitor BMP BID  - aggressive repletion while getting diuresis    #JOSE  RESOLVED  Cr elevated to 1.3, baseline 0.9, likely 2/2 hypoperfusion during cardiac arrest.   - Cr improving  - monitor BMP  - avoid nephrotoxic meds     HEME  #Plasma cell dyscrasia, amyloidosis  - f/u abdominal fat pad biopsy  - continue outpatient management for amyloid, patient to be followed by Dr. Lewis      I have personally provided 30 minutes of critical care time concurrently with the resident and  fellow.  I have reviewed the resident’s documentation and I agree with the resident’s assessment and plan of care.  TC Rodas

## 2019-05-14 NOTE — PROGRESS NOTE ADULT - SUBJECTIVE AND OBJECTIVE BOX
HEALTH ISSUES - PROBLEM Dx:  Restrictive cardiomyopathy: Restrictive cardiomyopathy  Transition of care performed with sharing of clinical summary: Transition of care performed with sharing of clinical summary          CHEMOTHERAPY REGIMEN:        Day:                          Diet:  Protocol:                                    IVF:      MEDICATIONS  (STANDING):  aspirin  chewable 81 milliGRAM(s) Oral daily  atorvastatin 40 milliGRAM(s) Oral at bedtime  chlorhexidine 2% Cloths 1 Application(s) Topical <User Schedule>  heparin  Injectable 5000 Unit(s) SubCutaneous every 8 hours  polyethylene glycol 3350 17 Gram(s) Oral daily    MEDICATIONS  (PRN):      Allergies    No Known Allergies    Intolerances        DVT Prophylaxis: [ ] YES [ ] NO      Antibiotics: [ ] YES [ ] NO    Pain Scale (1-10):       Location:    Vital Signs Last 24 Hrs  T(C): 36.5 (15 May 2019 12:00), Max: 36.9 (14 May 2019 20:13)  T(F): 97.7 (15 May 2019 12:00), Max: 98.5 (15 May 2019 05:22)  HR: 112 (15 May 2019 16:20) (94 - 112)  BP: 83/59 (15 May 2019 16:20) (78/53 - 96/68)  BP(mean): 71 (15 May 2019 16:20) (61 - 76)  RR: 24 (15 May 2019 16:20) (20 - 46)  SpO2: 92% (15 May 2019 16:20) (88% - 99%)    Drug Dosing Weight  Height (cm): 149.86 (10 May 2019 07:22)  Weight (kg): 79.1 (10 May 2019 10:30)  BMI (kg/m2): 35.2 (10 May 2019 10:30)  BSA (m2): 1.74 (10 May 2019 10:30)     Physical Exam:  · Constitutional	detailed exam  · Constitutional Details	well-developed; well-groomed  · Eyes	EOMI; PERRL; no drainage or redness  · ENMT Comments	dry mucous membranes  · Respiratory	detailed exam  · Respiratory Comments	normal breath sounds at the lung bases bilaterally  · Cardiovascular	Regular rate & rhythm, normal S1, S2; no murmurs, gallops or rubs; no S3, S4  · Abd-Soft non tender  ·Ext-no edema, clubbing or cyanosis    URINARY CATHETER: [ ] YES [ ] NO     LABS:  CBC Full  -  ( 15 May 2019 06:11 )  WBC Count : 9.93 K/uL  RBC Count : 4.36 M/uL  Hemoglobin : 12.1 g/dL  Hematocrit : 36.2 %  Platelet Count - Automated : 214 K/uL  Mean Cell Volume : 83.0 fl  Mean Cell Hemoglobin : 27.8 pg  Mean Cell Hemoglobin Concentration : 33.4 gm/dL  Auto Neutrophil # : x  Auto Lymphocyte # : x  Auto Monocyte # : x  Auto Eosinophil # : x  Auto Basophil # : x  Auto Neutrophil % : x  Auto Lymphocyte % : x  Auto Monocyte % : x  Auto Eosinophil % : x  Auto Basophil % : x    05-15    141  |  98  |  18  ----------------------------<  100<H>  4.1   |  36<H>  |  0.90    Ca    8.7      15 May 2019 06:11  Phos  2.4     05-15  Mg     2.0     05-15            CULTURES:    RADIOLOGY & ADDITIONAL STUDIES:

## 2019-05-14 NOTE — DIETITIAN INITIAL EVALUATION ADULT. - ENERGY NEEDS
Height: 4'11" Weight: 174.5 lbs, 160.2 lbs (5/14), 156.7 lbs (5/13), 158.9 lbs 95/12), 165.1 lbs (5/11), IBW 98 lbs+/-10%, %%, BMI 35.2,  upper range IBW used to calculate EER as per pt's current body weight >120% of IBW   Estimated nutrient needs based on [facility] SOC for maintenance in adults adjusted for obesity   FR 1000 mL/d per team

## 2019-05-14 NOTE — DIETITIAN INITIAL EVALUATION ADULT. - OTHER INFO
49 y.o F from home with PMHx of uncompensated dCHF, CVA, NSTEMI, HTN, Sickle Cell Trait and concern for amyloidosis. Pt admitted for cardiac arrest after general anesthesia induction and s/p abdominal wall biopsy to confirm diagnosis of amyloidosis, transferred to CCU post-operatively, s/p intubation and extubation 5/11. +NC tolerating well. SLP recommended regular diet with thin liquids. Pt cooks at home, follows regular diet, does not add salt, fair appetite, NKFA. Pt reports slight decreased appetite since x 5 months (January 2019),  lbs per pt, weight loss of 5 lbs (insignificant). Pt takes Metoprolol tartrate, Atorvastatin, Vitamin B12, Ferrous sulfate, Lasix, MVI per home meds. Tolerating DASH/TLC diet, FR 1000 mL/d well with fair 50-74% PO intake of meals. Pt endorse appetite improved slightly. Will continue to monitor appetite/PO intake of meals. Denies N/V/D/C or pain. +BM 5/13. Skin intact. Hypophosphatemia 2.3 L likely 2/2 post-op. Nutrition edu provided to pt on DASH/TLC diet with handouts. RD will f/u per moderate risk protocol.

## 2019-05-14 NOTE — PROGRESS NOTE ADULT - SUBJECTIVE AND OBJECTIVE BOX
TRANSFER NOTE: CCU TO [facility]    HOSPITAL COURSE: 49yoF with dCHF, hx CVA and NSTEMI, concern for amyloidosis, admitted post-operatively after abdominal fat pad biopsy due to post-induction bradycardic PEA arrest associated with flash pulmonary edema, likely a consequence of induced bradycardia in the setting of general anesthesia, now with mechanically vented and sedated; time down was 5 minutes with patient receiving CPR per ACLS protocol until ROSC achieved after 2 rounds, 1x dose epi. Pressor support was with Levophed, then epi drip, which was discontinued after patient arrived at CCU, mechanically intubated and sedated. Patient had poor urine output initially on Lasix and metolazone so she was switched to Bumex drip, and thereafter was diuresed to euvolemic state. She was extubated after 24 hours, and transistioned to BiPAP, eventually weaned to HFNC and then NC with adequate diuresis. She is mentating at baseline, tolerating PO, and has been OOB to chair. She is stable for stepdown to floors under cardiology care for further medical management.    INTERVAL HPI/OVERNIGHT EVENTS: Patient net negative 2.4 L yesterday. Asymptomatic hypotension to 72/46 overnight, immediately improved back to baseline SBP in the 90s when patient awakened. Comfortable on NC O2; did not require HFNC or BiPAP overnight.    SUBJECTIVE: Patient seen and examined at bedside. Feeling well, would like to go home soon and asks if biopsy results are back. Denies headache, fever, chills, syncope, near-syncope, chest pain, palpitations, shortness of breath, abdominal pain, nausea, vomiting, constipation, diarrhea, dysuria.    OBJECTIVE:    VITAL SIGNS:  ICU Vital Signs Last 24 Hrs  T(C): 36.8 (14 May 2019 09:00), Max: 37 (13 May 2019 13:00)  T(F): 98.3 (14 May 2019 09:00), Max: 98.6 (13 May 2019 13:00)  HR: 100 (14 May 2019 10:00) (96 - 116)  BP: 83/55 (14 May 2019 10:00) (72/46 - 99/69)  BP(mean): 60 (14 May 2019 10:00) (57 - 80)  ABP: 91/63 (13 May 2019 20:00) (91/63 - 91/63)  ABP(mean): 69 (13 May 2019 20:00) (69 - 69)  RR: 20 (14 May 2019 10:00) (14 - 38)  SpO2: 98% (14 May 2019 10:00) (84% - 99%)        05-13 @ 07:01 - 05-14 @ 07:00  --------------------------------------------------------  IN: 5 mL / OUT: 2635 mL / NET: -2630 mL    05-14 @ 07:01 - 05-14 @ 10:40  --------------------------------------------------------  IN: 320 mL / OUT: 0 mL / NET: 320 mL      CAPILLARY BLOOD GLUCOSE          PHYSICAL EXAM:    General: Adult Black female, overweight, well-groomed, well-developed, seated upright in chair; NAD  HEENT: PERRL; mucous membranes moist  Respiratory: clear to auscultation bilaterally; no rales noted  Cardiovascular: regular rate and rhythm, no murmur appreciated  Gastrointestinal: soft, non-tender, non-distended  Extremities: warm, well-perfused; trace pedal edema  Skin: good skin turgor; no rash  Neurological: AOx3, following commands, moving 4x extremities spontaneously  Lines/Drains/Tubes: PIV x2    MEDICATIONS:  MEDICATIONS  (STANDING):  aspirin  chewable 81 milliGRAM(s) Oral daily  atorvastatin 40 milliGRAM(s) Oral at bedtime  chlorhexidine 2% Cloths 1 Application(s) Topical <User Schedule>  clopidogrel Tablet 75 milliGRAM(s) Oral daily  heparin  Injectable 5000 Unit(s) SubCutaneous every 8 hours  pantoprazole  Injectable 40 milliGRAM(s) IV Push daily  polyethylene glycol 3350 17 Gram(s) Oral daily    MEDICATIONS  (PRN):      ALLERGIES:  Allergies    No Known Allergies    Intolerances        LABS:                        12.0   9.87  )-----------( 199      ( 14 May 2019 04:45 )             35.4     05-14    142  |  99  |  16  ----------------------------<  120<H>  3.9   |  33<H>  |  0.91    Ca    9.1      14 May 2019 09:20  Phos  2.3     05-14  Mg     2.1     05-14                  RADIOLOGY & ADDITIONAL TESTS: Reviewed.

## 2019-05-14 NOTE — PROGRESS NOTE ADULT - ASSESSMENT
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     CV  #Diastolic CHF 2/2 restrictive cardiomyopathy, suspected to be due to systemic amyloidosis. Echo with EF 40%, diastolic dysfunction, concentric LVH, reduced RV systolic function, moderate TR.  - now diuresing well on Bumex push; will Bumex this afternoon due to hypotension overnight and restart PO diuretics in AM  - strict I/Os, daily weights, daily CXR  - goal net negative 1.5-2 L/day, as BP permits  - no BB in setting of restrictive cardiomyopathy  - repeat echo to reassess EF    #CAD  History of NSTEMI.  - continue home ASA, Plavix  - continue home Lipitor    #Cardiac arrest  Patient with PEA arrest following anesthesia induction in OR, with ROSC obtained after approximately 5 minutes, epi x1. Suspect 2/2 flash pulmonary edema induced by bradycardia from anesthesia induction in setting of restrictive cardiomyopathy.  - patient now extubated, mentating at baseline  - plan as above    PULM  #Acute hypoxic respiratory failure  Likely 2/2 flash pulmonary edema from bradycardia during anesthesia induction.   - s/p extubation 5/11  - continue BiPAP and HFNC as needed  - wean NC O2 requirements as tolerated    RENAL  #Hypokalemia, hypomagnesemia  Likely 2/2 aggressive diuresis on Bumex drip.  - monitor BMP BID  - aggressive repletion while getting diuresis    #JOSE  RESOLVED  Cr elevated to 1.3, baseline 0.9, likely 2/2 hypoperfusion during cardiac arrest.   - Cr improving  - monitor BMP  - avoid nephrotoxic meds     HEME  #Plasma cell dyscrasia, amyloidosis  - f/u abdominal fat pad biopsy path report  - continue outpatient management for amyloid, patient to be followed by Dr. Lewis    FEN/GI/Ppx  F: none  E: replete PRN  N: DASH/TLC diet po  DVT ppx: heparin subQ  GI: PPI while on DAPT    Code Status: Full Code    Dispo: CCU to [facility]

## 2019-05-15 ENCOUNTER — TRANSCRIPTION ENCOUNTER (OUTPATIENT)
Age: 49
End: 2019-05-15

## 2019-05-15 ENCOUNTER — INBOUND DOCUMENT (OUTPATIENT)
Age: 49
End: 2019-05-15

## 2019-05-15 VITALS
RESPIRATION RATE: 24 BRPM | SYSTOLIC BLOOD PRESSURE: 83 MMHG | DIASTOLIC BLOOD PRESSURE: 59 MMHG | OXYGEN SATURATION: 92 % | HEART RATE: 112 BPM

## 2019-05-15 DIAGNOSIS — E85.9 AMYLOIDOSIS, UNSPECIFIED: ICD-10-CM

## 2019-05-15 PROBLEM — I21.9 ACUTE MYOCARDIAL INFARCTION, UNSPECIFIED: Chronic | Status: ACTIVE | Noted: 2019-05-09

## 2019-05-15 PROBLEM — G96.0 CEREBROSPINAL FLUID LEAK: Chronic | Status: ACTIVE | Noted: 2019-05-09

## 2019-05-15 PROBLEM — I50.9 HEART FAILURE, UNSPECIFIED: Chronic | Status: ACTIVE | Noted: 2019-05-09

## 2019-05-15 LAB
ANION GAP SERPL CALC-SCNC: 7 MMOL/L — SIGNIFICANT CHANGE UP (ref 5–17)
BUN SERPL-MCNC: 18 MG/DL — SIGNIFICANT CHANGE UP (ref 7–23)
CALCIUM SERPL-MCNC: 8.7 MG/DL — SIGNIFICANT CHANGE UP (ref 8.4–10.5)
CHLORIDE SERPL-SCNC: 98 MMOL/L — SIGNIFICANT CHANGE UP (ref 96–108)
CO2 SERPL-SCNC: 36 MMOL/L — HIGH (ref 22–31)
CREAT SERPL-MCNC: 0.9 MG/DL — SIGNIFICANT CHANGE UP (ref 0.5–1.3)
GLUCOSE SERPL-MCNC: 100 MG/DL — HIGH (ref 70–99)
HCT VFR BLD CALC: 36.2 % — SIGNIFICANT CHANGE UP (ref 34.5–45)
HGB BLD-MCNC: 12.1 G/DL — SIGNIFICANT CHANGE UP (ref 11.5–15.5)
MAGNESIUM SERPL-MCNC: 2 MG/DL — SIGNIFICANT CHANGE UP (ref 1.6–2.6)
MCHC RBC-ENTMCNC: 27.8 PG — SIGNIFICANT CHANGE UP (ref 27–34)
MCHC RBC-ENTMCNC: 33.4 GM/DL — SIGNIFICANT CHANGE UP (ref 32–36)
MCV RBC AUTO: 83 FL — SIGNIFICANT CHANGE UP (ref 80–100)
NRBC # BLD: 0 /100 WBCS — SIGNIFICANT CHANGE UP (ref 0–0)
PHOSPHATE SERPL-MCNC: 2.4 MG/DL — LOW (ref 2.5–4.5)
PLATELET # BLD AUTO: 214 K/UL — SIGNIFICANT CHANGE UP (ref 150–400)
POTASSIUM SERPL-MCNC: 4.1 MMOL/L — SIGNIFICANT CHANGE UP (ref 3.5–5.3)
POTASSIUM SERPL-SCNC: 4.1 MMOL/L — SIGNIFICANT CHANGE UP (ref 3.5–5.3)
RBC # BLD: 4.36 M/UL — SIGNIFICANT CHANGE UP (ref 3.8–5.2)
RBC # FLD: 14.8 % — HIGH (ref 10.3–14.5)
SODIUM SERPL-SCNC: 141 MMOL/L — SIGNIFICANT CHANGE UP (ref 135–145)
WBC # BLD: 9.93 K/UL — SIGNIFICANT CHANGE UP (ref 3.8–10.5)
WBC # FLD AUTO: 9.93 K/UL — SIGNIFICANT CHANGE UP (ref 3.8–10.5)

## 2019-05-15 PROCEDURE — 99233 SBSQ HOSP IP/OBS HIGH 50: CPT

## 2019-05-15 PROCEDURE — 71045 X-RAY EXAM CHEST 1 VIEW: CPT

## 2019-05-15 PROCEDURE — 86850 RBC ANTIBODY SCREEN: CPT

## 2019-05-15 PROCEDURE — 71045 X-RAY EXAM CHEST 1 VIEW: CPT | Mod: 26

## 2019-05-15 PROCEDURE — 80053 COMPREHEN METABOLIC PANEL: CPT

## 2019-05-15 PROCEDURE — 94660 CPAP INITIATION&MGMT: CPT

## 2019-05-15 PROCEDURE — 84295 ASSAY OF SERUM SODIUM: CPT

## 2019-05-15 PROCEDURE — 80048 BASIC METABOLIC PNL TOTAL CA: CPT

## 2019-05-15 PROCEDURE — 85018 HEMOGLOBIN: CPT

## 2019-05-15 PROCEDURE — 85025 COMPLETE CBC W/AUTO DIFF WBC: CPT

## 2019-05-15 PROCEDURE — 84132 ASSAY OF SERUM POTASSIUM: CPT

## 2019-05-15 PROCEDURE — 84100 ASSAY OF PHOSPHORUS: CPT

## 2019-05-15 PROCEDURE — 93306 TTE W/DOPPLER COMPLETE: CPT

## 2019-05-15 PROCEDURE — 86901 BLOOD TYPING SEROLOGIC RH(D): CPT

## 2019-05-15 PROCEDURE — 82330 ASSAY OF CALCIUM: CPT

## 2019-05-15 PROCEDURE — 86900 BLOOD TYPING SEROLOGIC ABO: CPT

## 2019-05-15 PROCEDURE — 99239 HOSP IP/OBS DSCHRG MGMT >30: CPT | Mod: GC

## 2019-05-15 PROCEDURE — 36415 COLL VENOUS BLD VENIPUNCTURE: CPT

## 2019-05-15 PROCEDURE — 92610 EVALUATE SWALLOWING FUNCTION: CPT

## 2019-05-15 PROCEDURE — 82803 BLOOD GASES ANY COMBINATION: CPT

## 2019-05-15 PROCEDURE — 83605 ASSAY OF LACTIC ACID: CPT

## 2019-05-15 PROCEDURE — 82962 GLUCOSE BLOOD TEST: CPT

## 2019-05-15 PROCEDURE — 83735 ASSAY OF MAGNESIUM: CPT

## 2019-05-15 PROCEDURE — 85730 THROMBOPLASTIN TIME PARTIAL: CPT

## 2019-05-15 PROCEDURE — 88313 SPECIAL STAINS GROUP 2: CPT

## 2019-05-15 PROCEDURE — 93005 ELECTROCARDIOGRAM TRACING: CPT

## 2019-05-15 PROCEDURE — 88304 TISSUE EXAM BY PATHOLOGIST: CPT

## 2019-05-15 PROCEDURE — 85610 PROTHROMBIN TIME: CPT

## 2019-05-15 PROCEDURE — 85027 COMPLETE CBC AUTOMATED: CPT

## 2019-05-15 RX ORDER — BUMETANIDE 0.25 MG/ML
2 INJECTION INTRAMUSCULAR; INTRAVENOUS ONCE
Refills: 0 | Status: COMPLETED | OUTPATIENT
Start: 2019-05-15 | End: 2019-05-15

## 2019-05-15 RX ORDER — AMLODIPINE BESYLATE 2.5 MG/1
1 TABLET ORAL
Qty: 0 | Refills: 0 | DISCHARGE

## 2019-05-15 RX ORDER — FUROSEMIDE 40 MG
1 TABLET ORAL
Qty: 0 | Refills: 0 | DISCHARGE

## 2019-05-15 RX ORDER — BUMETANIDE 0.25 MG/ML
1 INJECTION INTRAMUSCULAR; INTRAVENOUS
Qty: 30 | Refills: 0
Start: 2019-05-15 | End: 2019-06-13

## 2019-05-15 RX ADMIN — HEPARIN SODIUM 5000 UNIT(S): 5000 INJECTION INTRAVENOUS; SUBCUTANEOUS at 14:33

## 2019-05-15 RX ADMIN — BUMETANIDE 2 MILLIGRAM(S): 0.25 INJECTION INTRAMUSCULAR; INTRAVENOUS at 08:37

## 2019-05-15 RX ADMIN — POLYETHYLENE GLYCOL 3350 17 GRAM(S): 17 POWDER, FOR SOLUTION ORAL at 11:45

## 2019-05-15 RX ADMIN — HEPARIN SODIUM 5000 UNIT(S): 5000 INJECTION INTRAVENOUS; SUBCUTANEOUS at 06:33

## 2019-05-15 RX ADMIN — Medication 81 MILLIGRAM(S): at 11:45

## 2019-05-15 NOTE — PROGRESS NOTE ADULT - PROBLEM SELECTOR PLAN 1
Long discussion with Dr. Malone pathology, pt and her sister...pt has amyloidosis on Fat pad, will arrange for f/u at [facility]...
again long discussion with pt, her brother John and her sister...pt agrees that her best option for care is at an amyloid center...we will transfer her records as needed
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
pt with lambda light chain, and 20% plasma cells on marrow, but no amyloid on BM bx, had a cardiac arrest during general anesthesia before fat pad bx for r/o amyloid...
1) PCP Contacted on Admission: (Y/N) --> Name & Phone #: Dr. Jacinto MCGHEE  2) Date of Contact with PCP: 5/10/19  3) PCP Contacted at Discharge: (Y/N, N/A) Y  4) Summary of Handoff Given to PCP: PMD has full access to EMR  5) Post-Discharge Appointment Date and Location: [facility]

## 2019-05-15 NOTE — PROGRESS NOTE ADULT - NSHPATTENDINGPLANDISCUSS_GEN_ALL_CORE
patient's family, CHF consult team, CCU team
patient, sister, CHF consult team and CCU team
[employer], pt and her sister
pt, her sister and Dr. Casey
pt, sister and brother John...
patient, sister, CCU team and CHF consult team
team

## 2019-05-15 NOTE — PROGRESS NOTE ADULT - REASON FOR ADMISSION
cardiac arrest after general anesthesia induction

## 2019-05-15 NOTE — DISCHARGE NOTE NURSING/CASE MANAGEMENT/SOCIAL WORK - NSDCPEPT PROEDHF_GEN_ALL_CORE
Call primary care provider for follow up after discharge/Report signs and symptoms to primary care provider/Activities as tolerated/Monitor weight daily/Low salt diet

## 2019-05-15 NOTE — DISCHARGE NOTE PROVIDER - HOSPITAL COURSE
49yoF with dCHF, hx CVA and NSTEMI, concern for amyloidosis, admitted post-operatively after abdominal fat pad biopsy due to post-induction bradycardic PEA arrest associated with flash pulmonary edema, likely a consequence of induced bradycardia in the setting of general anesthesia, now with mechanically vented and sedated; time down was 5 minutes with patient receiving CPR per ACLS protocol until ROSC achieved after 2 rounds, 1x dose epi. Pressor support was with Levophed, then epi drip, which was discontinued after patient arrived at CCU, mechanically intubated and sedated. Patient had poor urine output initially on Lasix and metolazone so she was switched to Bumex drip, and thereafter was diuresed to euvolemic state. She was extubated after 24 hours, and transitioned to BiPAP, eventually weaned to HFNC and then NC with adequate diuresis. She is mentating at baseline, tolerating PO, and has been OOB to chair. Patient is now medically stable for discharge home to follow up with Dr. Rodas, Dr. Casey, Dr. Lewis, and Dr. George at [facility] for amyloidosis.

## 2019-05-15 NOTE — DISCHARGE NOTE NURSING/CASE MANAGEMENT/SOCIAL WORK - NSDCDPATPORTLINK_GEN_ALL_CORE
You can access the [facility] Patient Portal, offered by [facility], by registering with the following website: [email or web address]

## 2019-05-15 NOTE — DISCHARGE NOTE PROVIDER - CARE PROVIDER_API CALL
Cindy Rodas)  Cardiology; Interventional Cardiology  [address] [place] [address]  Phone: [phone]  Fax: [phone]  Follow Up Time:     Aliyah Lewis)  Internal Medicine  [address]th [place] [address]  Phone: [phone]  Fax: [phone]  Follow Up Time:     Jenny Casey)  Internal Medicine  [address] [id number]  Phone: [phone]  Fax: [phone]  Follow Up Time: Cindy Rodas)  Cardiology; Interventional Cardiology  [address] [address]  Phone: [phone]  Fax: [phone]  Follow Up Time:     Aliyah Lewis)  Internal Medicine  [address] [place] [address]  Phone: [phone]  Fax: [phone]  Follow Up Time:     Jenny Casey)  Internal Medicine  [address] [id number]  Phone: [phone]  Fax: [phone]  Follow Up Time:     Dc George  [address] [place] [address]  Phone: [phone]  Fax: (   )    -  Follow Up Time:

## 2019-05-15 NOTE — DISCHARGE NOTE PROVIDER - CARE PROVIDERS DIRECT ADDRESSES
,DirectAddress_Unknown,[email or web address] ,DirectAddress_Unknown,[email or web address],DirectAddress_Unknown

## 2019-05-15 NOTE — PROGRESS NOTE ADULT - SUBJECTIVE AND OBJECTIVE BOX
HEALTH ISSUES - PROBLEM Dx:  Amyloid disease: Amyloid disease  Restrictive cardiomyopathy: Restrictive cardiomyopathy  Transition of care performed with sharing of clinical summary: Transition of care performed with sharing of clinical summary          CHEMOTHERAPY REGIMEN:        Day:                          Diet:  Protocol:                                    IVF:      MEDICATIONS  (STANDING):  aspirin  chewable 81 milliGRAM(s) Oral daily  atorvastatin 40 milliGRAM(s) Oral at bedtime  chlorhexidine 2% Cloths 1 Application(s) Topical <User Schedule>  heparin  Injectable 5000 Unit(s) SubCutaneous every 8 hours  polyethylene glycol 3350 17 Gram(s) Oral daily    MEDICATIONS  (PRN):      Allergies    No Known Allergies    Intolerances        DVT Prophylaxis: [ ] YES [ ] NO      Antibiotics: [ ] YES [ ] NO    Pain Scale (1-10):       Location:    Vital Signs Last 24 Hrs  T(C): 36.5 (15 May 2019 12:00), Max: 36.9 (14 May 2019 20:13)  T(F): 97.7 (15 May 2019 12:00), Max: 98.5 (15 May 2019 05:22)  HR: 112 (15 May 2019 16:20) (94 - 112)  BP: 83/59 (15 May 2019 16:20) (78/53 - 96/68)  BP(mean): 71 (15 May 2019 16:20) (61 - 76)  RR: 24 (15 May 2019 16:20) (20 - 46)  SpO2: 92% (15 May 2019 16:20) (88% - 99%)    Drug Dosing Weight  Height (cm): 149.86 (10 May 2019 07:22)  Weight (kg): 79.1 (10 May 2019 10:30)  BMI (kg/m2): 35.2 (10 May 2019 10:30)  BSA (m2): 1.74 (10 May 2019 10:30)     Physical Exam:  · Constitutional	detailed exam  · Constitutional Details	well-developed; well-groomed  · Eyes	EOMI; PERRL; no drainage or redness  · ENMT Comments	dry mucous membranes  · Respiratory	detailed exam  · Respiratory Comments	normal breath sounds at the lung bases bilaterally  · Cardiovascular	Regular rate & rhythm, normal S1, S2; no murmurs, gallops or rubs; no S3, S4  · Abd-Soft non tender  ·Ext-no edema, clubbing or cyanosis    URINARY CATHETER: [ ] YES [ ] NO     LABS:  CBC Full  -  ( 15 May 2019 06:11 )  WBC Count : 9.93 K/uL  RBC Count : 4.36 M/uL  Hemoglobin : 12.1 g/dL  Hematocrit : 36.2 %  Platelet Count - Automated : 214 K/uL  Mean Cell Volume : 83.0 fl  Mean Cell Hemoglobin : 27.8 pg  Mean Cell Hemoglobin Concentration : 33.4 gm/dL  Auto Neutrophil # : x  Auto Lymphocyte # : x  Auto Monocyte # : x  Auto Eosinophil # : x  Auto Basophil # : x  Auto Neutrophil % : x  Auto Lymphocyte % : x  Auto Monocyte % : x  Auto Eosinophil % : x  Auto Basophil % : x    05-15    141  |  98  |  18  ----------------------------<  100<H>  4.1   |  36<H>  |  0.90    Ca    8.7      15 May 2019 06:11  Phos  2.4     05-15  Mg     2.0     05-15            CULTURES:    RADIOLOGY & ADDITIONAL STUDIES:

## 2019-05-15 NOTE — PROGRESS NOTE ADULT - PROBLEM SELECTOR PROBLEM 1
Amyloid disease
Amyloid disease
Restrictive cardiomyopathy
Restrictive cardiomyopathy
Transition of care performed with sharing of clinical summary

## 2019-05-15 NOTE — DISCHARGE NOTE PROVIDER - NSDCCPCAREPLAN_GEN_ALL_CORE_FT
PRINCIPAL DISCHARGE DIAGNOSIS  Diagnosis: PEA (Pulseless electrical activity)  Assessment and Plan of Treatment: You were admitted to the cardiac care unit after having cardiac arrest during an abdominal fat pad biopsy. You received CPR and your heart began pumping again. You had a breathing tube (intubated) and started on diuretics becasue you had too much fluid in your lungs. Your breathing continued to improve after weaning you off to the bipap, high flow nasal cannula, and eventually nasal cannula. You are now breathing comfortably on room air.  Please follow up with Dr. Rodas and Dr. Casey      SECONDARY DISCHARGE DIAGNOSES  Diagnosis: Cardiac amyloidosis  Assessment and Plan of Treatment: You had a biopsy that showed you have amyloidosis.  Please follow up with Dr. George at [facility] for futher evaluation and with Dr. Lewis.    Diagnosis: Restrictive cardiomyopathy  Assessment and Plan of Treatment: You have a history of restrictive cardiomyopathy likely due to amyloidosis which was seen on your biopsy. Please follow up with Dr. George at [facility] for futher evaluation and with Dr. Lewis.    Diagnosis: Flash pulmonary edema  Assessment and Plan of Treatment: When you had general anesthesia, your heart slowed down that led to too much fluid in your lungs. You received diuretics to help remove that fluid. Your symptoms have now improved.

## 2019-05-15 NOTE — DISCHARGE NOTE PROVIDER - NSDCFUADDAPPT_GEN_ALL_CORE_FT
Clark: May 20 at 1pm  Debbie:  Jacinto: May 28 at 2:45pm Clark: May 20 at 1pm  Debbie: May 29 at 3:30pm  Jacinto: May 28 at 2:45pm Clark: May 20 at 1pm  Debbie: May 29 at 3:30pm  Jacinto: May 28 at 2:45pm  Dr. George's office will contact you by the start of next week regarding an appointment.

## 2019-05-15 NOTE — DISCHARGE NOTE PROVIDER - PROVIDER TOKENS
PROVIDER:[TOKEN:[[id number]]],PROVIDER:[TOKEN:[[id number]]],PROVIDER:[TOKEN:[[id number]]] PROVIDER:[TOKEN:[[id number]]],PROVIDER:[TOKEN:[[id number]]],PROVIDER:[TOKEN:[[id number]]],FREE:[LAST:[Doris],FIRST:[Dc],PHONE:[phone]],FAX:[(   )    -],ADDRESS:[[address] [place] [address]]]

## 2019-05-17 ENCOUNTER — INBOUND DOCUMENT (OUTPATIENT)
Age: 49
End: 2019-05-17

## 2019-05-20 ENCOUNTER — APPOINTMENT (OUTPATIENT)
Dept: HEART AND VASCULAR | Facility: CLINIC | Age: 49
End: 2019-05-20
Payer: COMMERCIAL

## 2019-05-20 VITALS
SYSTOLIC BLOOD PRESSURE: 90 MMHG | HEART RATE: 108 BPM | OXYGEN SATURATION: 96 % | TEMPERATURE: 97.5 F | DIASTOLIC BLOOD PRESSURE: 72 MMHG | WEIGHT: 154.98 LBS | BODY MASS INDEX: 31.24 KG/M2 | HEIGHT: 58.98 IN

## 2019-05-20 DIAGNOSIS — I50.32 CHRONIC DIASTOLIC (CONGESTIVE) HEART FAILURE: ICD-10-CM

## 2019-05-20 DIAGNOSIS — J96.01 ACUTE RESPIRATORY FAILURE WITH HYPOXIA: ICD-10-CM

## 2019-05-20 DIAGNOSIS — I11.0 HYPERTENSIVE HEART DISEASE WITH HEART FAILURE: ICD-10-CM

## 2019-05-20 DIAGNOSIS — E87.1 HYPO-OSMOLALITY AND HYPONATREMIA: ICD-10-CM

## 2019-05-20 DIAGNOSIS — E87.5 HYPERKALEMIA: ICD-10-CM

## 2019-05-20 DIAGNOSIS — I25.2 OLD MYOCARDIAL INFARCTION: ICD-10-CM

## 2019-05-20 DIAGNOSIS — J81.0 ACUTE PULMONARY EDEMA: ICD-10-CM

## 2019-05-20 DIAGNOSIS — I46.9 CARDIAC ARREST, CAUSE UNSPECIFIED: ICD-10-CM

## 2019-05-20 DIAGNOSIS — T41.205A ADVERSE EFFECT OF UNSPECIFIED GENERAL ANESTHETICS, INITIAL ENCOUNTER: ICD-10-CM

## 2019-05-20 DIAGNOSIS — T88.59XA OTHER COMPLICATIONS OF ANESTHESIA, INITIAL ENCOUNTER: ICD-10-CM

## 2019-05-20 DIAGNOSIS — E85.9 AMYLOIDOSIS, UNSPECIFIED: ICD-10-CM

## 2019-05-20 DIAGNOSIS — N17.0 ACUTE KIDNEY FAILURE WITH TUBULAR NECROSIS: ICD-10-CM

## 2019-05-20 DIAGNOSIS — D57.3 SICKLE-CELL TRAIT: ICD-10-CM

## 2019-05-20 DIAGNOSIS — Z86.73 PERSONAL HISTORY OF TRANSIENT ISCHEMIC ATTACK (TIA), AND CEREBRAL INFARCTION WITHOUT RESIDUAL DEFICITS: ICD-10-CM

## 2019-05-20 DIAGNOSIS — E85.4 ORGAN-LIMITED AMYLOIDOSIS: ICD-10-CM

## 2019-05-20 DIAGNOSIS — I42.5 OTHER RESTRICTIVE CARDIOMYOPATHY: ICD-10-CM

## 2019-05-20 DIAGNOSIS — Z79.82 LONG TERM (CURRENT) USE OF ASPIRIN: ICD-10-CM

## 2019-05-20 PROCEDURE — 99214 OFFICE O/P EST MOD 30 MIN: CPT

## 2019-05-20 PROCEDURE — 93000 ELECTROCARDIOGRAM COMPLETE: CPT

## 2019-05-20 NOTE — HISTORY OF PRESENT ILLNESS
[FreeTextEntry1] : Followed by Jacinto and me\par Hospitalized earlier this month (May 10-15, 2019) for abdominal fat pad biopsy (Positive for amyloid)\par procedure complicated by PEA.\par recovered quickly in CCU. received IV diuretics with resolution of her edema.\par Now plans being made for chemotx for AL amyloid. Appt with Dr. Juana Michel at [facility] on Thursday and will also see Matthew George.\par Since discharge on bumex 2 mg, notes return of her edema\par \par 50 yo female \par \par  Peak Troponin T 0.26 . Patient underwent diagnostic cardiac cath 9/12/18 \par revealing distal apical LAD 50-60% (small vessel), LM RCA and LCx normal, LVEF \par 55%, LVEDP 14 mm Hg. 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

## 2019-05-20 NOTE — ASSESSMENT
[FreeTextEntry1] : Followed by Jacinto and me\par Hospitalized earlier this month (May 10-15, 2019) for abdominal fat pad biopsy (Positive for amyloid)\par procedure complicated by PEA.\par recovered quickly in CCU. received IV diuretics with resolution of her edema.\par Now plans being made for chemotx for AL amyloid. Appt with Dr. Juana Michel at [facility] on Thursday and will also see Matthew George.\par Since discharge on bumex 2 mg, notes return of her edema.\par \par Will increase bumex to 4 mg\par metolazone 5 mg 30 minutes before bumex tomorrow and Wednesday.\par \par

## 2019-05-20 NOTE — REASON FOR VISIT
[Heart Failure] : congestive heart failure [Family Member] : family member [Follow-Up - Clinic] : a clinic follow-up of [FreeTextEntry1] : amyloid

## 2019-05-20 NOTE — DISCUSSION/SUMMARY
[FreeTextEntry1] : edema persists on lasix 40\par switch to torsemide 40 mg po qd\par f/u Bhusri next week scheduled

## 2019-05-20 NOTE — PHYSICAL EXAM
[General Appearance - Well Developed] : well developed [Normal Appearance] : normal appearance [Well Groomed] : well groomed [General Appearance - Well Nourished] : well nourished [No Deformities] : no deformities [General Appearance - In No Acute Distress] : no acute distress [Normal Conjunctiva] : the conjunctiva exhibited no abnormalities [Eyelids - No Xanthelasma] : the eyelids demonstrated no xanthelasmas [Normal Oral Mucosa] : normal oral mucosa [No Oral Pallor] : no oral pallor [No Oral Cyanosis] : no oral cyanosis [Normal Jugular Venous A Waves Present] : normal jugular venous A waves present [Normal Jugular Venous V Waves Present] : normal jugular venous V waves present [No Jugular Venous Forbes A Waves] : no jugular venous forbes A waves [Respiration, Rhythm And Depth] : normal respiratory rhythm and effort [Exaggerated Use Of Accessory Muscles For Inspiration] : no accessory muscle use [Auscultation Breath Sounds / Voice Sounds] : lungs were clear to auscultation bilaterally [Abdomen Soft] : soft [Abdomen Tenderness] : non-tender [Abdomen Mass (___ Cm)] : no abdominal mass palpated [Abnormal Walk] : normal gait [Gait - Sufficient For Exercise Testing] : the gait was sufficient for exercise testing [Nail Clubbing] : no clubbing of the fingernails [Cyanosis, Localized] : no localized cyanosis [Petechial Hemorrhages (___cm)] : no petechial hemorrhages [Skin Color & Pigmentation] : normal skin color and pigmentation [] : no rash [No Venous Stasis] : no venous stasis [Skin Lesions] : no skin lesions [No Skin Ulcers] : no skin ulcer [No Xanthoma] : no  xanthoma was observed [Oriented To Time, Place, And Person] : oriented to person, place, and time [Affect] : the affect was normal [Mood] : the mood was normal [No Anxiety] : not feeling anxious [5th Left ICS - MCL] : palpated at the 5th LICS in the midclavicular line [Normal] : normal [Rhythm Regular] : regular [Normal S1] : normal S1 [Normal S2] : normal S2 [___ +] : bilateral [unfilled]U+ pretibial pitting edema

## 2019-05-28 ENCOUNTER — APPOINTMENT (OUTPATIENT)
Dept: HEART AND VASCULAR | Facility: CLINIC | Age: 49
End: 2019-05-28

## 2019-05-29 ENCOUNTER — APPOINTMENT (OUTPATIENT)
Dept: HEMATOLOGY ONCOLOGY | Facility: CLINIC | Age: 49
End: 2019-05-29

## 2019-06-04 LAB — MISCELLANEOUS TEST NAME: SIGNIFICANT CHANGE UP

## 2019-06-24 NOTE — HISTORY OF PRESENT ILLNESS
[FreeTextEntry1] : 48 y/o patient w/pmh of HTN, sickle cell trait, ventriculomegaly, and CVA presents for CVA evalation/f/u. Patient states she had a stroke 2019- pt was on way to work on the train- felt very heated and took her scarf off. Patient was transported and received care at [facility]. Patient was admitted for 2 days and was discharged on 2019. [facility]: \par 19: CT w/cerebral perfusion w/contrast: left MCA core infarction w/large volume of ischemic penumbra extending more superior (see scanned docs). \par 19: CT w/ou contrast post TPA: left frontal temporal infarc; no evidence of hemorrhage\par Chest x-ray 19 Pulmonary vascular congestion and left basilar atelectasis. \par Patient followed up with PCP right afterwards: Dr. Robert Barnes. Ordered an MRI w/out contrast of head on March 15, 2019 at [facility]. Patient lost 10lbs since MI 2018. Patient did not do PT or OT. \par \par The day of the stroke, experienced right side weakness of both UE and LE for a few minutes, loss of speech and slurred speech, couldn't not tell right date of birth; [birth year] (which is her son's birthday); change of handwriting- can't print/cursive correctly; no blurry vision, no double vision; no headaches, \par \par Still has SOB- walks one block or one flight of stairs and feels fatigue. Will be f/u with Dr. Hoskins on 3/28/19 for cardiotherapy. \par \par Hx of cerebral spinal fluid disorder- had an operation 10 years ago after complaints of runny nose at [facility] in [place]. \par \par Family hx: \par -mother  of renal failure- stroke at age 47\par -father alive: stroke at age 70\par \par Patient snore very loud; stop breathing while sleeping \par \par

## 2019-06-24 NOTE — PHYSICAL EXAM
[FreeTextEntry1] : The patient is alert and oriented x3, naming intact x3, repetition normal, follows three-step commands, and is able to participate fully in the history taking.\par Speech is normal with no evidence of dysarthria.\par Memory is intact: Immediate recall 3 out of 3, short-term 3 out of 3, remote memory intact\par Cranial nerves II through XII intact\par Motor exam: Upper extremities 4 out of 5 power, ++mild spasticity of right arm; Lower extremities 5 out of 5 power. No abnormal movements noted.\par Sensory exam: Intact to light touch and pinprick. Romberg negative.\par Coordination and vestibular exam: Finger to nose intact, no evidence of truncal or appendicular ataxia. No evidence of nystagmus. no vestibular symptoms elicited with head turning during ambulation.\par Gait: Normal stance and gait.\par Reflexes: One to 2+ in upper and lower extremities. No pathological reflexes. \par HEENT: Normocephalic atraumatic\par \par \par

## 2019-06-24 NOTE — ASSESSMENT
[FreeTextEntry1] : 48 y/o patient w/pmh of HTN, sickle cell trait, ventriculomegaly, and CVA presents for CVA evaluation/f/u. Patient has some difficulty with handwriting and has some very mild spasticity  and weakness of her right arm. Patient also has shortness of breath when walking flight of stair or one block. \par \par Plan:\par -MRA head/neck w/out contrast looking for dissection study\par -CHANTELLE and echo referral\par -Loop recorder (consult with Dr. Hoskins)\par -hemoglobin electrophoresis and referral to Dr. Lewis for further coagulation studies\par -RX added: clopidogrel 75mg for 1-2 months\par \par \par Patient to f/u in a month\par \par \par

## 2020-06-22 NOTE — DIETITIAN INITIAL EVALUATION ADULT. - 30 CAL TO
Anesthesia Evaluation     Patient summary reviewed and Nursing notes reviewed   NPO Solid Status: > 8 hours  NPO Liquid Status: > 8 hours           Airway   Mallampati: II  TM distance: >3 FB  Neck ROM: full  No difficulty expected  Dental - normal exam     Pulmonary - normal exam   (+) COPD, asthma,sleep apnea,   Cardiovascular - normal exam    ECG reviewed    (+) hypertension, past MI , CAD, dysrhythmias Atrial Fib, Tachycardia, angina, CHF , hyperlipidemia,       Neuro/Psych  (+) psychiatric history,     GI/Hepatic/Renal/Endo    (+) morbid obesity, GERD,      Musculoskeletal     Abdominal  - normal exam    Bowel sounds: normal.   Substance History      OB/GYN          Other                        Anesthesia Plan    ASA 4     general     intravenous induction     Anesthetic plan, all risks, benefits, and alternatives have been provided, discussed and informed consent has been obtained with: patient.       [id number]

## 2021-02-09 NOTE — DISCHARGE NOTE NURSING/CASE MANAGEMENT/SOCIAL WORK - MODE OF TRANSPORTATION
Diagnosis (Required): Psoriasis
Ambulatory
Stelara Monitoring Guidelines: A yearly test for tuberculosis is required while taking Stelara.
Is Phototherapy Contraindicated?: No
Detail Level: Zone
Pregnancy And Lactation Warning Text: This medication is Pregnancy Category B and is considered safe during pregnancy. It is unknown if this medication is excreted in breast milk.
Stelara Dosing: 45mg SC at week 0 and 4 and then every 12 weeks thereafter

## 2022-08-23 NOTE — SWALLOW BEDSIDE ASSESSMENT ADULT - POSITIONING
upright (90 degrees) Dupixent Pregnancy And Lactation Text: This medication likely crosses the placenta but the risk for the fetus is uncertain. This medication is excreted in breast milk.

## 2022-11-04 NOTE — DISCHARGE NOTE NURSING/CASE MANAGEMENT/SOCIAL WORK - NSDCFUADDAPPT_GEN_ALL_CORE_FT
Care Management Follow Up    Length of Stay (days): 114    Expected Discharge Date: 11/09/2022     Concerns to be Addressed:       Patient plan of care discussed at interdisciplinary rounds: Yes    Anticipated Discharge Disposition: Skilled Nursing Facility     Anticipated Discharge Services:    Anticipated Discharge DME:      Patient/family educated on Medicare website which has current facility and service quality ratings:    Education Provided on the Discharge Plan:    Patient/Family in Agreement with the Plan: yes    Referrals Placed by CM/SW:    Private pay costs discussed: Not applicable    Additional Information:  Per notes, care conference requested for Monday.  Spoke to daughter Kaylynn and she stated 11:00 on Monday would work for her.  Let her know if other family members wish to attend can do so by conference call.  Also called CHAIN Bailey and updated him the the time and he will be at hospital also.  Unsure who physician will be on Monday, CCRN will need to f/u by Monday morning.  Have requested CV conference room be reserved.    Addendum:  [facility] room reserved for care conference on 11/7 at 11:00.    Loretta Landis RN, BSN, PHN  Inpatient Care Coordination  [facility] [place]  Phone: [phone]           Clark: May 20 at 1pm  Debbie: May 29 at 3:30pm  Jacinto: May 28 at 2:45pm  Dr. George's office will contact you by the start of next week regarding an appointment.

## 2023-06-18 NOTE — PHYSICAL EXAM
ILSA/RUTHY Discharge Plan  Informed patient is ready for discharge.  Patient to be picked up by [facility]. Patient/interested person has been counseled for post hospitalization care.   . Initial implementation of the patient’s discharge plan has been arranged, including any devices/equipment needed for discharge. Discharge plan communicated to MD, RN, RUTHY, ILSA and Receiving Facility/Agency.    Patient’s discharge destination is  .    Selected Continued Care - Admitted Since 6/14/2023     Destination  Coordination complete.    Service Provider Selected Services Address Phone Fax    [facility] [address] [place] [address] -- --              [facility] FINAL       PATIENT FAMILY AWARE OF DISCHARGE PLANS: ILSA called pt's dter twice and was unable to leave message d/t voicemail being full. SW called other contact spouse and he was very confused. SW to attempt call to pt's dter again.      PAN FACILITY   Was PAN facility offered?: Yes   Was PAN facility chosen by patient/family?: Yes   If outside facility, Why?:     NURSING HOME:   New Placement:Yes   Resumption: No jail: No     TRANSPORTATION NOTE   Transportation mode: Ambulance   Name of transportation service: [facility]   Phone number:      Transportation scheduled for (date/time): 6/18 @ 12:30pm  Transportation confirmed with: RN  10:06 AM   ILSA attempted to call pt's dter again and is still unable to leave a message d/t vm being full. SW to follow.   11:46 AM   ILSA called pt's dter and informed her transport will be at [facility] at 12:30pm to bring pt to [facility]. ILSA to follow.    [General Appearance - Well Developed] : well developed [Normal Appearance] : normal appearance [Well Groomed] : well groomed [General Appearance - Well Nourished] : well nourished [No Deformities] : no deformities [General Appearance - In No Acute Distress] : no acute distress [Normal Conjunctiva] : the conjunctiva exhibited no abnormalities [Eyelids - No Xanthelasma] : the eyelids demonstrated no xanthelasmas [Normal Oral Mucosa] : normal oral mucosa [No Oral Pallor] : no oral pallor [No Oral Cyanosis] : no oral cyanosis [Normal Jugular Venous A Waves Present] : normal jugular venous A waves present [Normal Jugular Venous V Waves Present] : normal jugular venous V waves present [No Jugular Venous Forbes A Waves] : no jugular venous forbes A waves [Respiration, Rhythm And Depth] : normal respiratory rhythm and effort [Exaggerated Use Of Accessory Muscles For Inspiration] : no accessory muscle use [Auscultation Breath Sounds / Voice Sounds] : lungs were clear to auscultation bilaterally [Heart Rate And Rhythm] : heart rate and rhythm were normal [Heart Sounds] : normal S1 and S2 [Murmurs] : no murmurs present [Abdomen Soft] : soft [Abdomen Tenderness] : non-tender [Abdomen Mass (___ Cm)] : no abdominal mass palpated [Abnormal Walk] : normal gait [Gait - Sufficient For Exercise Testing] : the gait was sufficient for exercise testing [Nail Clubbing] : no clubbing of the fingernails [Cyanosis, Localized] : no localized cyanosis [Petechial Hemorrhages (___cm)] : no petechial hemorrhages [Skin Color & Pigmentation] : normal skin color and pigmentation [] : no rash [No Venous Stasis] : no venous stasis [Skin Lesions] : no skin lesions [No Skin Ulcers] : no skin ulcer [No Xanthoma] : no  xanthoma was observed [Oriented To Time, Place, And Person] : oriented to person, place, and time [Affect] : the affect was normal [Mood] : the mood was normal [No Anxiety] : not feeling anxious
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
## 2025-05-07 NOTE — ASU PATIENT PROFILE, ADULT - NS PRO TALK SOMEONE YN
[de-identified] : 4/16/24: 72 y/o F presents with constant cough since 2016. She reports it is productive and she expectorates clear phlegm, it is every day, throughout the day. No issues eating, or chewing, but she has had choking episodes on solids/ liquids over the past 2 months. No issues breathing. She has seen Dr. Mane and tested negative for asthma. No regurgitation of food. She feels her voice is raspy, worse over the past 6 months. Works as a . No significant voice demands. She has HTN, not on any ACE inhibitors. 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 Avila. She has history of reflux. She is on Pantoprazole and following a low acid diet, previously on Famotidine for at least one year with no improvement.  Diet: +tea, mint, garlic, onion, blueberry, carbonated beverages -coffee, soda, chocolate, tomato, citrus, garlic, spicy food water intake: 10 glasses of [employer] late night eating: no  Of note she has chronic rhinorrhea and postnasal drip. She denies facial pain or changes in smell or taste. She started using Flonase which she finds helpful, no rinses. Saw allergist 2 years ago. No pertinent positives per pt.  - 8/12/24 Patient has been following a low acid diet, added famotidine qPM to pantoprazole qAM, and daily saline sinus rinses, nasal steroids, azelastine and notes 70% improvement in terms of her cough. Voice improved with coughing less. She continues to cough in the mornings when she wakes up and in the afternoon at the office otherwise her cough is not present nearly as severely or frequently as it was before. She believes that it is related to her nose, triggered by the carpet in her office. Nasal drainage worse with eating cold foods. No issues eating, chewing, or swallowing. No breathing issues. No new ENT issues otherwise. - 4/11/25: Patient presents for repeat evaluation. She has been following a low acid diet and on antireflux medications, using nasal saline rinses and nasal antihistamines and feels cough worsened over the past 5 months. Overall cough is 60% improved compared to symptom onset. Last CXR was in 4/24 which was normal. She endorses multiple coughing fits throughout the day, longest she can go is 1 hour. She feels cough is triggered by certain scents such as aerosol sprays and perfumes. No issues eating, chewing, or swallowing. She feels her voice has been stable.  - [FreeTextEntry1] : 5/7/25 Continued pantoprazole/famotidine and initiated tramadol trial without improvement. Cough suppression therapy not yet scheduled.  Still with cough.  No new symptoms.  No new ENT issues otherwise. no